# Patient Record
Sex: MALE | Race: WHITE | Employment: OTHER | URBAN - NONMETROPOLITAN AREA
[De-identification: names, ages, dates, MRNs, and addresses within clinical notes are randomized per-mention and may not be internally consistent; named-entity substitution may affect disease eponyms.]

---

## 2023-07-18 ENCOUNTER — HOSPITAL ENCOUNTER (INPATIENT)
Age: 83
LOS: 7 days | Discharge: HOME | DRG: 246 | End: 2023-07-25
Attending: INTERNAL MEDICINE | Admitting: INTERNAL MEDICINE
Payer: MEDICARE

## 2023-07-18 DIAGNOSIS — I50.23 ACUTE ON CHRONIC SYSTOLIC CHF (CONGESTIVE HEART FAILURE), NYHA CLASS 4 (HCC): Primary | ICD-10-CM

## 2023-07-18 PROBLEM — I21.4 NSTEMI (NON-ST ELEVATED MYOCARDIAL INFARCTION) (HCC): Status: ACTIVE | Noted: 2023-07-18

## 2023-07-18 PROBLEM — I50.21 ACUTE HFREF (HEART FAILURE WITH REDUCED EJECTION FRACTION) (HCC): Status: ACTIVE | Noted: 2023-07-18

## 2023-07-18 PROBLEM — I48.91 ATRIAL FIBRILLATION (HCC): Status: ACTIVE | Noted: 2023-07-18

## 2023-07-18 PROBLEM — I35.0 NONRHEUMATIC AORTIC VALVE STENOSIS: Status: ACTIVE | Noted: 2023-07-18

## 2023-07-18 LAB
APTT PPP: 48.5 SECONDS (ref 22–38)
EKG Q-T INTERVAL: 372 MS
EKG QRS DURATION: 134 MS
EKG QTC CALCULATION (BAZETT): 494 MS
EKG R AXIS: 28 DEGREES
EKG T AXIS: -127 DEGREES
EKG VENTRICULAR RATE: 106 BPM
HEPARIN UNFRACTIONATED: 0.21 U/ML (ref 0.3–0.7)
HEPARIN UNFRACTIONATED: 0.45 U/ML (ref 0.3–0.7)
TROPONIN, HIGH SENSITIVITY: 551 NG/L (ref 0–12)
TSH SERPL DL<=0.005 MIU/L-ACNC: 2.01 UIU/ML (ref 0.4–4.2)

## 2023-07-18 PROCEDURE — 84484 ASSAY OF TROPONIN QUANT: CPT

## 2023-07-18 PROCEDURE — 36415 COLL VENOUS BLD VENIPUNCTURE: CPT

## 2023-07-18 PROCEDURE — 85730 THROMBOPLASTIN TIME PARTIAL: CPT

## 2023-07-18 PROCEDURE — 2580000003 HC RX 258: Performed by: PHYSICIAN ASSISTANT

## 2023-07-18 PROCEDURE — 93005 ELECTROCARDIOGRAM TRACING: CPT | Performed by: PHYSICIAN ASSISTANT

## 2023-07-18 PROCEDURE — 6370000000 HC RX 637 (ALT 250 FOR IP): Performed by: INTERNAL MEDICINE

## 2023-07-18 PROCEDURE — 93010 ELECTROCARDIOGRAM REPORT: CPT | Performed by: INTERNAL MEDICINE

## 2023-07-18 PROCEDURE — 2060000000 HC ICU INTERMEDIATE R&B

## 2023-07-18 PROCEDURE — 2500000003 HC RX 250 WO HCPCS: Performed by: INTERNAL MEDICINE

## 2023-07-18 PROCEDURE — 84443 ASSAY THYROID STIM HORMONE: CPT

## 2023-07-18 PROCEDURE — 6360000002 HC RX W HCPCS: Performed by: PHYSICIAN ASSISTANT

## 2023-07-18 PROCEDURE — 99232 SBSQ HOSP IP/OBS MODERATE 35: CPT | Performed by: PHYSICIAN ASSISTANT

## 2023-07-18 PROCEDURE — 85520 HEPARIN ASSAY: CPT

## 2023-07-18 PROCEDURE — 99223 1ST HOSP IP/OBS HIGH 75: CPT | Performed by: INTERNAL MEDICINE

## 2023-07-18 RX ORDER — ACETAMINOPHEN 650 MG/1
650 SUPPOSITORY RECTAL EVERY 6 HOURS PRN
Status: DISCONTINUED | OUTPATIENT
Start: 2023-07-18 | End: 2023-07-25 | Stop reason: HOSPADM

## 2023-07-18 RX ORDER — FUROSEMIDE 20 MG/1
20 TABLET ORAL DAILY
Status: DISCONTINUED | OUTPATIENT
Start: 2023-07-18 | End: 2023-07-19

## 2023-07-18 RX ORDER — SODIUM CHLORIDE 0.9 % (FLUSH) 0.9 %
5-40 SYRINGE (ML) INJECTION PRN
Status: DISCONTINUED | OUTPATIENT
Start: 2023-07-18 | End: 2023-07-20 | Stop reason: SDUPTHER

## 2023-07-18 RX ORDER — ONDANSETRON 2 MG/ML
4 INJECTION INTRAMUSCULAR; INTRAVENOUS EVERY 6 HOURS PRN
Status: DISCONTINUED | OUTPATIENT
Start: 2023-07-18 | End: 2023-07-25 | Stop reason: HOSPADM

## 2023-07-18 RX ORDER — LOSARTAN POTASSIUM 100 MG/1
100 TABLET ORAL DAILY
COMMUNITY

## 2023-07-18 RX ORDER — ACETAMINOPHEN 325 MG/1
650 TABLET ORAL EVERY 6 HOURS PRN
Status: DISCONTINUED | OUTPATIENT
Start: 2023-07-18 | End: 2023-07-20 | Stop reason: SDUPTHER

## 2023-07-18 RX ORDER — SODIUM CHLORIDE 9 MG/ML
INJECTION, SOLUTION INTRAVENOUS PRN
Status: DISCONTINUED | OUTPATIENT
Start: 2023-07-18 | End: 2023-07-20 | Stop reason: SDUPTHER

## 2023-07-18 RX ORDER — POLYETHYLENE GLYCOL 3350 17 G/17G
17 POWDER, FOR SOLUTION ORAL DAILY PRN
Status: DISCONTINUED | OUTPATIENT
Start: 2023-07-18 | End: 2023-07-25 | Stop reason: HOSPADM

## 2023-07-18 RX ORDER — HEPARIN SODIUM 1000 [USP'U]/ML
2000 INJECTION, SOLUTION INTRAVENOUS; SUBCUTANEOUS PRN
Status: DISCONTINUED | OUTPATIENT
Start: 2023-07-18 | End: 2023-07-24 | Stop reason: SDUPTHER

## 2023-07-18 RX ORDER — ASPIRIN 81 MG/1
81 TABLET ORAL DAILY
Status: DISCONTINUED | OUTPATIENT
Start: 2023-07-18 | End: 2023-07-25 | Stop reason: HOSPADM

## 2023-07-18 RX ORDER — SODIUM CHLORIDE 9 MG/ML
INJECTION, SOLUTION INTRAVENOUS CONTINUOUS
Status: DISCONTINUED | OUTPATIENT
Start: 2023-07-18 | End: 2023-07-19

## 2023-07-18 RX ORDER — HEPARIN SODIUM 1000 [USP'U]/ML
4000 INJECTION, SOLUTION INTRAVENOUS; SUBCUTANEOUS PRN
Status: DISCONTINUED | OUTPATIENT
Start: 2023-07-18 | End: 2023-07-24 | Stop reason: SDUPTHER

## 2023-07-18 RX ORDER — SODIUM CHLORIDE 0.9 % (FLUSH) 0.9 %
5-40 SYRINGE (ML) INJECTION EVERY 12 HOURS SCHEDULED
Status: DISCONTINUED | OUTPATIENT
Start: 2023-07-18 | End: 2023-07-20 | Stop reason: SDUPTHER

## 2023-07-18 RX ORDER — LOSARTAN POTASSIUM 100 MG/1
100 TABLET ORAL DAILY
Status: DISCONTINUED | OUTPATIENT
Start: 2023-07-19 | End: 2023-07-20

## 2023-07-18 RX ORDER — HEPARIN SODIUM 10000 [USP'U]/100ML
5-30 INJECTION, SOLUTION INTRAVENOUS CONTINUOUS
Status: DISCONTINUED | OUTPATIENT
Start: 2023-07-18 | End: 2023-07-24

## 2023-07-18 RX ORDER — METOPROLOL SUCCINATE 25 MG/1
25 TABLET, EXTENDED RELEASE ORAL DAILY
Status: DISCONTINUED | OUTPATIENT
Start: 2023-07-18 | End: 2023-07-25 | Stop reason: HOSPADM

## 2023-07-18 RX ORDER — ATORVASTATIN CALCIUM 40 MG/1
40 TABLET, FILM COATED ORAL NIGHTLY
Status: DISCONTINUED | OUTPATIENT
Start: 2023-07-18 | End: 2023-07-24 | Stop reason: ALTCHOICE

## 2023-07-18 RX ORDER — ONDANSETRON 4 MG/1
4 TABLET, ORALLY DISINTEGRATING ORAL EVERY 8 HOURS PRN
Status: DISCONTINUED | OUTPATIENT
Start: 2023-07-18 | End: 2023-07-25 | Stop reason: HOSPADM

## 2023-07-18 RX ADMIN — HEPARIN SODIUM 2000 UNITS: 1000 INJECTION INTRAVENOUS; SUBCUTANEOUS at 13:21

## 2023-07-18 RX ADMIN — SODIUM CHLORIDE: 9 INJECTION, SOLUTION INTRAVENOUS at 12:37

## 2023-07-18 RX ADMIN — AMIODARONE HYDROCHLORIDE 150 MG: 1.5 INJECTION, SOLUTION INTRAVENOUS at 18:08

## 2023-07-18 RX ADMIN — METOPROLOL SUCCINATE 25 MG: 25 TABLET, EXTENDED RELEASE ORAL at 18:10

## 2023-07-18 RX ADMIN — AMIODARONE HYDROCHLORIDE 1 MG/MIN: 1.8 INJECTION, SOLUTION INTRAVENOUS at 18:24

## 2023-07-18 RX ADMIN — ATORVASTATIN CALCIUM 40 MG: 40 TABLET, FILM COATED ORAL at 19:47

## 2023-07-18 RX ADMIN — FUROSEMIDE 20 MG: 20 TABLET ORAL at 18:10

## 2023-07-18 RX ADMIN — ASPIRIN 81 MG: 81 TABLET, COATED ORAL at 18:10

## 2023-07-18 RX ADMIN — AMIODARONE HYDROCHLORIDE 0.5 MG/MIN: 1.8 INJECTION, SOLUTION INTRAVENOUS at 23:53

## 2023-07-18 RX ADMIN — HEPARIN SODIUM 11.3 UNITS/KG/HR: 10000 INJECTION, SOLUTION INTRAVENOUS at 13:16

## 2023-07-18 ASSESSMENT — ENCOUNTER SYMPTOMS
SORE THROAT: 0
VOMITING: 0
NAUSEA: 0
SHORTNESS OF BREATH: 1

## 2023-07-18 ASSESSMENT — PAIN SCALES - GENERAL: PAINLEVEL_OUTOF10: 0

## 2023-07-18 NOTE — CONSULTS
[facility]  Cardiology Consult      Patient:  Karolyn Escobedo  YOB: 1940    MRN: [id number]   Acct: [de-identified]     Primary Care Physician: No primary care provider on file. REASON FOR CONSULT:   CHF  New CMP  Severe aortic stenosis  NSTEMI     CHIEF COMPLAINT:    Shortness of breath     HISTORY OF PRESENT ILLNESS:    Karolyn Escobedo is a pleasant 81year old male patient with past medical history that includes hypertension. He denies prior known h/o cardiac disease. The patient presented to [address] [place] [address],[address] with 2 weeks history of worsening dyspnea on exertion, fatigue and shortness of breath. Patient denies chest pain or syncope. Has intermittent dizziness and leg swelling. Work up at [address] [place] [address] revealed elevated Troponin concerning for NSTEMI. Patient was also diagnosed with atrial fibrillation (new per patient). Echocardiogram revealed an EF of 25%, severe global hypokinesis, LVH, Mild ODALYS, Moderate mitral regurgitation, RVSP 29 mmHg, severe aortic stenosis with NATALY 0.9 cm2, PG 35 mmHg, MG 19 mmHg (per report, images are not available for review). He was transferred to [facility] for further management. At this time, patient denies chest pain. He feels better. EKG at [facility] revealed atrial fibrillation, , PVCs, septal infarct, IVCD. HS Troponin 551. All labs, EKG's, diagnostic testing and images as well as cardiac cath, stress testing   were reviewed during this encounter    CARDIAC TESTING    Ekg:   Atrial fibrillation with rapid ventricular response with premature ventricular or aberrantly conducted complexes  Intraventricular conduction delay  Cannot rule out Septal infarct , age undetermined  Abnormal ECG  No previous ECGs available    Past Medical History:    No past medical history on file. Past Surgical History:    No past surgical history on file.     Medications Prior to Admission:    Medications Prior to Admission: losartan (COZAAR) 100 MG tablet, Take 1

## 2023-07-19 LAB
ANION GAP SERPL CALC-SCNC: 15 MEQ/L (ref 8–16)
BASOPHILS ABSOLUTE: 0 THOU/MM3 (ref 0–0.1)
BASOPHILS NFR BLD AUTO: 0.6 %
BUN SERPL-MCNC: 31 MG/DL (ref 7–22)
CALCIUM SERPL-MCNC: 8.7 MG/DL (ref 8.5–10.5)
CHLORIDE SERPL-SCNC: 105 MEQ/L (ref 98–111)
CO2 SERPL-SCNC: 19 MEQ/L (ref 23–33)
CREAT SERPL-MCNC: 1.3 MG/DL (ref 0.4–1.2)
DEPRECATED RDW RBC AUTO: 45.5 FL (ref 35–45)
EKG ATRIAL RATE: 64 BPM
EKG P AXIS: 68 DEGREES
EKG P-R INTERVAL: 244 MS
EKG Q-T INTERVAL: 482 MS
EKG QRS DURATION: 138 MS
EKG QTC CALCULATION (BAZETT): 497 MS
EKG R AXIS: 51 DEGREES
EKG T AXIS: -92 DEGREES
EKG VENTRICULAR RATE: 64 BPM
EOSINOPHIL NFR BLD AUTO: 0.4 %
EOSINOPHILS ABSOLUTE: 0 THOU/MM3 (ref 0–0.4)
ERYTHROCYTE [DISTWIDTH] IN BLOOD BY AUTOMATED COUNT: 13.9 % (ref 11.5–14.5)
GFR SERPL CREATININE-BSD FRML MDRD: 55 ML/MIN/1.73M2
GLUCOSE SERPL-MCNC: 142 MG/DL (ref 70–108)
HCT VFR BLD AUTO: 39.8 % (ref 42–52)
HEPARIN UNFRACTIONATED: 0.4 U/ML (ref 0.3–0.7)
HEPARIN UNFRACTIONATED: 0.47 U/ML (ref 0.3–0.7)
HGB BLD-MCNC: 12.5 GM/DL (ref 14–18)
IMM GRANULOCYTES # BLD AUTO: 0.03 THOU/MM3 (ref 0–0.07)
IMM GRANULOCYTES NFR BLD AUTO: 0.4 %
LYMPHOCYTES ABSOLUTE: 1 THOU/MM3 (ref 1–4.8)
LYMPHOCYTES NFR BLD AUTO: 11.8 %
MCH RBC QN AUTO: 28.5 PG (ref 26–33)
MCHC RBC AUTO-ENTMCNC: 31.4 GM/DL (ref 32.2–35.5)
MCV RBC AUTO: 90.9 FL (ref 80–94)
MONOCYTES ABSOLUTE: 0.6 THOU/MM3 (ref 0.4–1.3)
MONOCYTES NFR BLD AUTO: 7.8 %
NEUTROPHILS NFR BLD AUTO: 79 %
NRBC BLD AUTO-RTO: 0 /100 WBC
PLATELET # BLD AUTO: 261 THOU/MM3 (ref 130–400)
PMV BLD AUTO: 10 FL (ref 9.4–12.4)
POTASSIUM SERPL-SCNC: 4.7 MEQ/L (ref 3.5–5.2)
RBC # BLD AUTO: 4.38 MILL/MM3 (ref 4.7–6.1)
SEGMENTED NEUTROPHILS ABSOLUTE COUNT: 6.4 THOU/MM3 (ref 1.8–7.7)
SODIUM SERPL-SCNC: 139 MEQ/L (ref 135–145)
WBC # BLD AUTO: 8.1 THOU/MM3 (ref 4.8–10.8)

## 2023-07-19 PROCEDURE — 6370000000 HC RX 637 (ALT 250 FOR IP): Performed by: PHYSICIAN ASSISTANT

## 2023-07-19 PROCEDURE — 99232 SBSQ HOSP IP/OBS MODERATE 35: CPT | Performed by: NURSE PRACTITIONER

## 2023-07-19 PROCEDURE — 93010 ELECTROCARDIOGRAM REPORT: CPT | Performed by: INTERNAL MEDICINE

## 2023-07-19 PROCEDURE — 99232 SBSQ HOSP IP/OBS MODERATE 35: CPT | Performed by: PHYSICIAN ASSISTANT

## 2023-07-19 PROCEDURE — 6370000000 HC RX 637 (ALT 250 FOR IP): Performed by: INTERNAL MEDICINE

## 2023-07-19 PROCEDURE — 93005 ELECTROCARDIOGRAM TRACING: CPT | Performed by: PHYSICIAN ASSISTANT

## 2023-07-19 PROCEDURE — 6360000002 HC RX W HCPCS: Performed by: PHYSICIAN ASSISTANT

## 2023-07-19 PROCEDURE — 6370000000 HC RX 637 (ALT 250 FOR IP): Performed by: NURSE PRACTITIONER

## 2023-07-19 PROCEDURE — 85025 COMPLETE CBC W/AUTO DIFF WBC: CPT

## 2023-07-19 PROCEDURE — 2060000000 HC ICU INTERMEDIATE R&B

## 2023-07-19 PROCEDURE — 36415 COLL VENOUS BLD VENIPUNCTURE: CPT

## 2023-07-19 PROCEDURE — 80048 BASIC METABOLIC PNL TOTAL CA: CPT

## 2023-07-19 PROCEDURE — 2500000003 HC RX 250 WO HCPCS: Performed by: INTERNAL MEDICINE

## 2023-07-19 PROCEDURE — 85520 HEPARIN ASSAY: CPT

## 2023-07-19 RX ORDER — SODIUM CHLORIDE 0.9 % (FLUSH) 0.9 %
5-40 SYRINGE (ML) INJECTION EVERY 12 HOURS SCHEDULED
Status: DISCONTINUED | OUTPATIENT
Start: 2023-07-19 | End: 2023-07-20 | Stop reason: SDUPTHER

## 2023-07-19 RX ORDER — FUROSEMIDE 20 MG/1
20 TABLET ORAL ONCE
Status: COMPLETED | OUTPATIENT
Start: 2023-07-19 | End: 2023-07-19

## 2023-07-19 RX ORDER — SODIUM CHLORIDE 9 MG/ML
INJECTION, SOLUTION INTRAVENOUS PRN
Status: DISCONTINUED | OUTPATIENT
Start: 2023-07-19 | End: 2023-07-20 | Stop reason: SDUPTHER

## 2023-07-19 RX ORDER — FUROSEMIDE 40 MG/1
40 TABLET ORAL DAILY
Status: DISCONTINUED | OUTPATIENT
Start: 2023-07-20 | End: 2023-07-20

## 2023-07-19 RX ORDER — SODIUM CHLORIDE 0.9 % (FLUSH) 0.9 %
5-40 SYRINGE (ML) INJECTION PRN
Status: DISCONTINUED | OUTPATIENT
Start: 2023-07-19 | End: 2023-07-20 | Stop reason: SDUPTHER

## 2023-07-19 RX ADMIN — AMIODARONE HYDROCHLORIDE 0.5 MG/MIN: 1.8 INJECTION, SOLUTION INTRAVENOUS at 23:56

## 2023-07-19 RX ADMIN — FUROSEMIDE 20 MG: 20 TABLET ORAL at 07:54

## 2023-07-19 RX ADMIN — LOSARTAN POTASSIUM 100 MG: 100 TABLET, FILM COATED ORAL at 07:54

## 2023-07-19 RX ADMIN — HEPARIN SODIUM 13.3 UNITS/KG/HR: 10000 INJECTION, SOLUTION INTRAVENOUS at 12:44

## 2023-07-19 RX ADMIN — ASPIRIN 81 MG: 81 TABLET, COATED ORAL at 07:54

## 2023-07-19 RX ADMIN — METOPROLOL SUCCINATE 25 MG: 25 TABLET, EXTENDED RELEASE ORAL at 07:54

## 2023-07-19 RX ADMIN — ATORVASTATIN CALCIUM 40 MG: 40 TABLET, FILM COATED ORAL at 19:57

## 2023-07-19 RX ADMIN — FUROSEMIDE 20 MG: 20 TABLET ORAL at 17:12

## 2023-07-19 RX ADMIN — AMIODARONE HYDROCHLORIDE 0.5 MG/MIN: 1.8 INJECTION, SOLUTION INTRAVENOUS at 12:16

## 2023-07-19 ASSESSMENT — PAIN SCALES - GENERAL
PAINLEVEL_OUTOF10: 0

## 2023-07-19 NOTE — CARE COORDINATION
Case Management Assessment  Initial Evaluation    Date/Time of Evaluation: 7/19/2023 11:37 AM  Assessment Completed by: Mona Chance RN    If patient is discharged prior to next notation, then this note serves as note for discharge by case management. Patient Name: Alondra Peguero                   YOB: 1940  Diagnosis: Atrial fibrillation [facility]) [I48.91]                   Date / Time: 7/18/2023 11:27 AM  Location: [address] [place] [address]     Patient Admission Status: Inpatient   Readmission Risk Low 0-14, Mod 15-19), High > 20: Readmission Risk Score: 8.3    Current PCP: Charis Tapia MD  PCP verified by CM? Yes    Chart Reviewed: Yes      History Provided by: Patient  Patient Orientation: Alert and Oriented    Patient Cognition: Alert    Hospitalization in the last 30 days (Readmission):  No    If yes, Readmission Assessment in CM Navigator will be completed. Advance Directives:      Code Status: Full Code   Patient's Primary Decision Maker is: Legal Next of Kin      Discharge Planning:    Patient lives with: Spouse/Significant Other Type of Home: House  Primary Care Giver: Self  Patient Support Systems include: Spouse/Significant Other, Children   Current Financial resources: Medicare  Current community resources: None  Current services prior to admission: None            Current DME:              Type of Home Care services:  None    ADLS  Prior functional level: Independent in ADLs/IADLs  Current functional level: Independent in ADLs/IADLs    Family can provide assistance at DC: Yes  Would you like Case Management to discuss the discharge plan with any other family members/significant others, and if so, who?  No  Plans to Return to Present Housing: Yes  Other Identified Issues/Barriers to RETURNING to current housing: medical complications  Potential Assistance needed at discharge: N/A            Potential DME:    Patient expects to discharge to: [address] [place] [address] for transportation at discharge:

## 2023-07-20 ENCOUNTER — APPOINTMENT (OUTPATIENT)
Dept: CARDIAC CATH/INVASIVE PROCEDURES | Age: 83
DRG: 246 | End: 2023-07-20
Attending: INTERNAL MEDICINE
Payer: MEDICARE

## 2023-07-20 LAB
BDY SITE: ABNORMAL
BDY SITE: ABNORMAL
COLLECTED BY:: ABNORMAL
COLLECTED BY:: ABNORMAL
DEPRECATED RDW RBC AUTO: 46.7 FL (ref 35–45)
ERYTHROCYTE [DISTWIDTH] IN BLOOD BY AUTOMATED COUNT: 14 % (ref 11.5–14.5)
HCT VFR BLD AUTO: 39.6 % (ref 42–52)
HEPARIN UNFRACTIONATED: 0.42 U/ML (ref 0.3–0.7)
HGB BLD-MCNC: 12.5 GM/DL (ref 14–18)
LV EF: 33 %
LVEF MODALITY: NORMAL
MCH RBC QN AUTO: 29.1 PG (ref 26–33)
MCHC RBC AUTO-ENTMCNC: 31.6 GM/DL (ref 32.2–35.5)
MCV RBC AUTO: 92.1 FL (ref 80–94)
PLATELET # BLD AUTO: 214 THOU/MM3 (ref 130–400)
PMV BLD AUTO: 10.1 FL (ref 9.4–12.4)
RBC # BLD AUTO: 4.3 MILL/MM3 (ref 4.7–6.1)
SAO2 % BLD: 65 % (ref 94–97)
SAO2 % BLD: 98 % (ref 94–97)
WBC # BLD AUTO: 9 THOU/MM3 (ref 4.8–10.8)

## 2023-07-20 PROCEDURE — B24BZZ4 ULTRASONOGRAPHY OF HEART WITH AORTA, TRANSESOPHAGEAL: ICD-10-PCS | Performed by: INTERNAL MEDICINE

## 2023-07-20 PROCEDURE — C1887 CATHETER, GUIDING: HCPCS

## 2023-07-20 PROCEDURE — 93320 DOPPLER ECHO COMPLETE: CPT

## 2023-07-20 PROCEDURE — 2500000003 HC RX 250 WO HCPCS

## 2023-07-20 PROCEDURE — 6360000002 HC RX W HCPCS: Performed by: PHYSICIAN ASSISTANT

## 2023-07-20 PROCEDURE — 2580000003 HC RX 258: Performed by: INTERNAL MEDICINE

## 2023-07-20 PROCEDURE — 2700000000 HC OXYGEN THERAPY PER DAY

## 2023-07-20 PROCEDURE — 85027 COMPLETE CBC AUTOMATED: CPT

## 2023-07-20 PROCEDURE — 93312 ECHO TRANSESOPHAGEAL: CPT

## 2023-07-20 PROCEDURE — B2151ZZ FLUOROSCOPY OF LEFT HEART USING LOW OSMOLAR CONTRAST: ICD-10-PCS | Performed by: INTERNAL MEDICINE

## 2023-07-20 PROCEDURE — 6370000000 HC RX 637 (ALT 250 FOR IP): Performed by: PHYSICIAN ASSISTANT

## 2023-07-20 PROCEDURE — 85520 HEPARIN ASSAY: CPT

## 2023-07-20 PROCEDURE — 82810 BLOOD GASES O2 SAT ONLY: CPT

## 2023-07-20 PROCEDURE — 6370000000 HC RX 637 (ALT 250 FOR IP): Performed by: INTERNAL MEDICINE

## 2023-07-20 PROCEDURE — 2060000000 HC ICU INTERMEDIATE R&B

## 2023-07-20 PROCEDURE — 6360000002 HC RX W HCPCS

## 2023-07-20 PROCEDURE — 2500000003 HC RX 250 WO HCPCS: Performed by: INTERNAL MEDICINE

## 2023-07-20 PROCEDURE — 36415 COLL VENOUS BLD VENIPUNCTURE: CPT

## 2023-07-20 PROCEDURE — C1769 GUIDE WIRE: HCPCS

## 2023-07-20 PROCEDURE — 93325 DOPPLER ECHO COLOR FLOW MAPG: CPT

## 2023-07-20 PROCEDURE — B2111ZZ FLUOROSCOPY OF MULTIPLE CORONARY ARTERIES USING LOW OSMOLAR CONTRAST: ICD-10-PCS | Performed by: INTERNAL MEDICINE

## 2023-07-20 PROCEDURE — 93456 R HRT CORONARY ARTERY ANGIO: CPT

## 2023-07-20 PROCEDURE — 99232 SBSQ HOSP IP/OBS MODERATE 35: CPT | Performed by: PHYSICIAN ASSISTANT

## 2023-07-20 PROCEDURE — 6360000004 HC RX CONTRAST MEDICATION: Performed by: INTERNAL MEDICINE

## 2023-07-20 PROCEDURE — C1894 INTRO/SHEATH, NON-LASER: HCPCS

## 2023-07-20 PROCEDURE — 93456 R HRT CORONARY ARTERY ANGIO: CPT | Performed by: INTERNAL MEDICINE

## 2023-07-20 PROCEDURE — 6370000000 HC RX 637 (ALT 250 FOR IP)

## 2023-07-20 PROCEDURE — 4A023N6 MEASUREMENT OF CARDIAC SAMPLING AND PRESSURE, RIGHT HEART, PERCUTANEOUS APPROACH: ICD-10-PCS | Performed by: INTERNAL MEDICINE

## 2023-07-20 RX ORDER — SODIUM CHLORIDE 0.9 % (FLUSH) 0.9 %
5-40 SYRINGE (ML) INJECTION PRN
Status: DISCONTINUED | OUTPATIENT
Start: 2023-07-20 | End: 2023-07-25 | Stop reason: HOSPADM

## 2023-07-20 RX ORDER — SODIUM CHLORIDE 9 MG/ML
INJECTION, SOLUTION INTRAVENOUS CONTINUOUS
Status: DISCONTINUED | OUTPATIENT
Start: 2023-07-20 | End: 2023-07-21

## 2023-07-20 RX ORDER — ACETAMINOPHEN 325 MG/1
650 TABLET ORAL EVERY 4 HOURS PRN
Status: DISCONTINUED | OUTPATIENT
Start: 2023-07-20 | End: 2023-07-24 | Stop reason: SDUPTHER

## 2023-07-20 RX ORDER — ATROPINE SULFATE 0.4 MG/ML
0.5 INJECTION, SOLUTION INTRAVENOUS
Status: DISPENSED | OUTPATIENT
Start: 2023-07-20 | End: 2023-07-21

## 2023-07-20 RX ORDER — SODIUM CHLORIDE 0.9 % (FLUSH) 0.9 %
5-40 SYRINGE (ML) INJECTION EVERY 12 HOURS SCHEDULED
Status: DISCONTINUED | OUTPATIENT
Start: 2023-07-20 | End: 2023-07-25 | Stop reason: HOSPADM

## 2023-07-20 RX ORDER — BUMETANIDE 0.25 MG/ML
1 INJECTION INTRAMUSCULAR; INTRAVENOUS DAILY
Status: DISCONTINUED | OUTPATIENT
Start: 2023-07-21 | End: 2023-07-25 | Stop reason: HOSPADM

## 2023-07-20 RX ORDER — SODIUM CHLORIDE 9 MG/ML
INJECTION, SOLUTION INTRAVENOUS PRN
Status: DISCONTINUED | OUTPATIENT
Start: 2023-07-20 | End: 2023-07-24 | Stop reason: SDUPTHER

## 2023-07-20 RX ADMIN — ATORVASTATIN CALCIUM 40 MG: 40 TABLET, FILM COATED ORAL at 21:00

## 2023-07-20 RX ADMIN — AMIODARONE HYDROCHLORIDE 0.5 MG/MIN: 1.8 INJECTION, SOLUTION INTRAVENOUS at 12:26

## 2023-07-20 RX ADMIN — ASPIRIN 81 MG: 81 TABLET, COATED ORAL at 09:20

## 2023-07-20 RX ADMIN — EMPAGLIFLOZIN 10 MG: 10 TABLET, FILM COATED ORAL at 21:00

## 2023-07-20 RX ADMIN — HEPARIN SODIUM 13.3 UNITS/KG/HR: 10000 INJECTION, SOLUTION INTRAVENOUS at 13:23

## 2023-07-20 RX ADMIN — LOSARTAN POTASSIUM 100 MG: 100 TABLET, FILM COATED ORAL at 09:20

## 2023-07-20 RX ADMIN — IOPAMIDOL 60 ML: 755 INJECTION, SOLUTION INTRAVENOUS at 19:18

## 2023-07-20 RX ADMIN — METOPROLOL SUCCINATE 25 MG: 25 TABLET, EXTENDED RELEASE ORAL at 09:20

## 2023-07-20 RX ADMIN — SODIUM CHLORIDE, PRESERVATIVE FREE 10 ML: 5 INJECTION INTRAVENOUS at 21:00

## 2023-07-20 RX ADMIN — SODIUM CHLORIDE: 9 INJECTION, SOLUTION INTRAVENOUS at 21:00

## 2023-07-20 ASSESSMENT — PAIN SCALES - GENERAL
PAINLEVEL_OUTOF10: 0

## 2023-07-20 NOTE — BRIEF OP NOTE
[facility] Silve[facility]  Sedation/Analgesia Post Sedation Record    Pt Name: Felipe Swift  Account number: [de-identified]  MRN: [id number]  YOB: 1940  Procedure Performed By: MD MD Julio Paz [place]  Primary Care Physician: Claudia Degroot MD  Date: 7/20/2023    POST-PROCEDURE    Physicians/Assistants: MD MD Julio Paz, MANUEL    Procedure Performed:Cath      Sedation/Anesthesia: Versed/ Fentanyl and 2% xylocaine local anesthesia. Estimated Blood Loss: < 50 ml. Specimens Removed: None         Disposition of Specimen: N/A        Complications: No Immediate Complications.        Post-procedure Diagnosis/Findings:       MV CAD and severe AS  PCI/TAVR vs CTS - will need to discuss with family and patient before proceeding further regarding next best options           MD MD Julio Paz [place]  Electronically signed 7/20/2023 at 7:07 PM  Interventional Cardiology

## 2023-07-21 PROBLEM — I50.20 HEART FAILURE WITH REDUCED EJECTION FRACTION (HCC): Status: ACTIVE | Noted: 2023-07-21

## 2023-07-21 LAB
ANION GAP SERPL CALC-SCNC: 12 MEQ/L (ref 8–16)
BUN SERPL-MCNC: 32 MG/DL (ref 7–22)
CALCIUM SERPL-MCNC: 8.4 MG/DL (ref 8.5–10.5)
CHLORIDE SERPL-SCNC: 107 MEQ/L (ref 98–111)
CO2 SERPL-SCNC: 20 MEQ/L (ref 23–33)
CREAT SERPL-MCNC: 1.6 MG/DL (ref 0.4–1.2)
GFR SERPL CREATININE-BSD FRML MDRD: 43 ML/MIN/1.73M2
GLUCOSE SERPL-MCNC: 124 MG/DL (ref 70–108)
HEPARIN UNFRACTIONATED: 0.23 U/ML (ref 0.3–0.7)
HEPARIN UNFRACTIONATED: 0.6 U/ML (ref 0.3–0.7)
HEPARIN UNFRACTIONATED: 0.62 U/ML (ref 0.3–0.7)
POTASSIUM SERPL-SCNC: 4.3 MEQ/L (ref 3.5–5.2)
SODIUM SERPL-SCNC: 139 MEQ/L (ref 135–145)

## 2023-07-21 PROCEDURE — 85520 HEPARIN ASSAY: CPT

## 2023-07-21 PROCEDURE — 99232 SBSQ HOSP IP/OBS MODERATE 35: CPT | Performed by: PHYSICIAN ASSISTANT

## 2023-07-21 PROCEDURE — 6360000002 HC RX W HCPCS: Performed by: PHYSICIAN ASSISTANT

## 2023-07-21 PROCEDURE — 2500000003 HC RX 250 WO HCPCS: Performed by: INTERNAL MEDICINE

## 2023-07-21 PROCEDURE — 99223 1ST HOSP IP/OBS HIGH 75: CPT | Performed by: INTERNAL MEDICINE

## 2023-07-21 PROCEDURE — 99223 1ST HOSP IP/OBS HIGH 75: CPT | Performed by: THORACIC SURGERY (CARDIOTHORACIC VASCULAR SURGERY)

## 2023-07-21 PROCEDURE — 80048 BASIC METABOLIC PNL TOTAL CA: CPT

## 2023-07-21 PROCEDURE — 6370000000 HC RX 637 (ALT 250 FOR IP): Performed by: INTERNAL MEDICINE

## 2023-07-21 PROCEDURE — 36415 COLL VENOUS BLD VENIPUNCTURE: CPT

## 2023-07-21 PROCEDURE — 2060000000 HC ICU INTERMEDIATE R&B

## 2023-07-21 PROCEDURE — 6370000000 HC RX 637 (ALT 250 FOR IP): Performed by: NURSE PRACTITIONER

## 2023-07-21 RX ORDER — AMIODARONE HYDROCHLORIDE 200 MG/1
200 TABLET ORAL DAILY
Status: DISCONTINUED | OUTPATIENT
Start: 2023-07-27 | End: 2023-07-25 | Stop reason: HOSPADM

## 2023-07-21 RX ORDER — AMIODARONE HYDROCHLORIDE 200 MG/1
200 TABLET ORAL 2 TIMES DAILY
Status: DISCONTINUED | OUTPATIENT
Start: 2023-07-21 | End: 2023-07-25 | Stop reason: HOSPADM

## 2023-07-21 RX ADMIN — EMPAGLIFLOZIN 10 MG: 10 TABLET, FILM COATED ORAL at 10:10

## 2023-07-21 RX ADMIN — AMIODARONE HYDROCHLORIDE 0.5 MG/MIN: 1.8 INJECTION, SOLUTION INTRAVENOUS at 03:05

## 2023-07-21 RX ADMIN — AMIODARONE HYDROCHLORIDE 200 MG: 200 TABLET ORAL at 21:06

## 2023-07-21 RX ADMIN — ATORVASTATIN CALCIUM 40 MG: 40 TABLET, FILM COATED ORAL at 21:07

## 2023-07-21 RX ADMIN — ASPIRIN 81 MG: 81 TABLET, COATED ORAL at 09:50

## 2023-07-21 RX ADMIN — HEPARIN SODIUM 15.3 UNITS/KG/HR: 10000 INJECTION, SOLUTION INTRAVENOUS at 21:14

## 2023-07-21 RX ADMIN — HEPARIN SODIUM 2000 UNITS: 1000 INJECTION INTRAVENOUS; SUBCUTANEOUS at 05:43

## 2023-07-21 RX ADMIN — AMIODARONE HYDROCHLORIDE 0.5 MG/MIN: 1.8 INJECTION, SOLUTION INTRAVENOUS at 15:43

## 2023-07-21 RX ADMIN — METOPROLOL SUCCINATE 25 MG: 25 TABLET, EXTENDED RELEASE ORAL at 17:24

## 2023-07-21 RX ADMIN — BUMETANIDE 1 MG: 0.25 INJECTION INTRAMUSCULAR; INTRAVENOUS at 09:50

## 2023-07-21 ASSESSMENT — ENCOUNTER SYMPTOMS
EYE DISCHARGE: 0
ABDOMINAL PAIN: 0
SHORTNESS OF BREATH: 1
COLOR CHANGE: 0

## 2023-07-21 ASSESSMENT — PAIN SCALES - GENERAL
PAINLEVEL_OUTOF10: 0

## 2023-07-21 NOTE — CARE COORDINATION
7/21/23, 2:03 PM EDT    DISCHARGE ON GOING EVALUATION    [address] [facility] day: 3  Location: -12/012-A Reason for admit: Atrial fibrillation [facility]) [I48.91]   Procedure:   7/20 Cardiac cath   7/20 DON pending EF 30-35%  Barriers to Discharge: CVS consult, Dietician consult, creatinine 1.6, IV fluids, Amiodarone drip, Asa, Lipitor, IV Bumex, Jardiance, Heparin drip, Toprol XL. PCP: Jonn Scott MD  Readmission Risk Score: 11.9%  Patient Goals/Plan/Treatment Preferences: Plan is to return home with spouse. Denies needs. Will follow. 7/21/23, 2:25 PM EDT    Patient goals/plan/ treatment preferences discussed by  and . Patient goals/plan/ treatment preferences reviewed with patient/ family. Patient/ family verbalize understanding of discharge plan and are in agreement with goal/plan/treatment preferences. Understanding was demonstrated using the teach back method. AVS provided by RN at time of discharge, which includes all necessary medical information pertaining to the patients current course of illness, treatment, post-discharge goals of care, and treatment preferences. Services At/After Discharge: None  Potential weekend discharge. Denies needs/services.         IMM Letter  IMM Letter given to Patient/Family/Significant other/Guardian/POA/by[de-identified] ALL  IMM Letter date given[de-identified] 07/21/23  IMM Letter time given[de-identified] 5435

## 2023-07-21 NOTE — CONSULTS
Cardiothoracic Surgery History & Physical       Patient:  Korey Martinez  YOB: 1940    MRN: [id number]     Acct: [de-identified]    PCP: Kaden Seth MD    Date of Admission: 7/18/2023    Chief Complaint:  Dyspnea, fatigue    History Of Present Illness:  81 y.o. male, no previous history of CV disease, presents with chronic progressive fatigue and dyspnea on minimal exertion, acutely exacerbated by new onset of atrial fibrillation. Ruled in for NSTEMI. Patient denies chest pain. Admission echocardiography shows LVEF 25%, dilated cardiomyopathy, severe AS/moderate AI, and moderate MR. Patient denies orthopnea or presyncope. Subsequent LHC shows RCA and Cx , high-grade LAD lesion. Past Medical History:      Hypertension    Past Surgical History:      No past surgical history on file. Medications Prior to Admission:      Prior to Admission medications    Medication Sig Start Date End Date Taking? Authorizing Provider   losartan (COZAAR) 100 MG tablet Take 1 tablet by mouth daily   Yes Historical Provider, MD       Allergies:  Patient has no known allergies. Social History:      TOBACCO:   reports that he has never smoked. He has never used smokeless tobacco.  ETOH:   reports no history of alcohol use. Social History     Substance and Sexual Activity   Drug Use Never         Family History:      No family history on file. REVIEW OFSYSTEMS:      Review of Systems   Constitutional:  Positive for activity change and fatigue. HENT:  Negative for congestion. Eyes:  Negative for discharge. Respiratory:  Positive for shortness of breath. Cardiovascular:  Positive for palpitations. Negative for chest pain and leg swelling. Gastrointestinal:  Negative for abdominal pain. Endocrine: Negative for cold intolerance. Genitourinary:  Negative for difficulty urinating. Skin:  Negative for color change. Allergic/Immunologic: Negative for environmental allergies.    Neurological:

## 2023-07-21 NOTE — PROCEDURES
[place] [address]                            CARDIAC CATHETERIZATION    PATIENT NAME: Jomar Metzger                     :        1940  MED REC NO:   [id number]                           ROOM:       0012  ACCOUNT NO:   [de-identified]                           ADMIT DATE: 2023  PROVIDER:     Brian Yip MD    DATE OF PROCEDURE:  2023    CARDIAC CATHETERIZATION    INDICATION:  NYHA class IV CHF, severe symptomatic aortic stenosis, low  ejection fraction/cardiomyopathy. DESCRIPTION OF PROCEDURE:  After informed consent was obtained from the  patient, he was brought to the cardiac catheterization laboratory and  prepped in sterile fashion. Right radial artery and right brachial vein  were chosen as primary point of access. After infiltration of the right  brachial region with 2% lidocaine using micropuncture and modified  Seldinger technique under fluoroscopic guidance, and ultrasound  guidance, I was able to insert a 5-Cayman Islander sheath into the right brachial  vein. After infiltration of the right wrist with 2% lidocaine using  micropuncture and modified Seldinger technique under fluoroscopic  guidance and ultrasound guidance, I was able to insert a 6-Cay[place] Islander  Slender sheath into right radial artery. Standard  antispasmodic/antithrombotic cocktail was given intraarterially. We  then performed diagnostic right heart catheterization using a 5-Cay[place] Islander  Ahumada balloon-tipped wedge catheter. We did left heart catheterization  using 5-Cay[place] Islander JL3.5 and JR4 catheters. RIGHT HEART CATHETERIZATION:  RA 19, RV 64/20, PA 61/29 with a mean of  40, wedge pressure of 32, cardiac index 2, PA saturation 65%. CORONARY ANGIOGRAM:  LEFT MAIN:  Patent without any significant obstruction. LAD:  Severe multilevel disease with 70% to 80% throughout the entire  proximal and mid section of the LAD.   There is a diagonal branch

## 2023-07-22 ENCOUNTER — APPOINTMENT (OUTPATIENT)
Dept: INTERVENTIONAL RADIOLOGY/VASCULAR | Age: 83
DRG: 246 | End: 2023-07-22
Attending: INTERNAL MEDICINE
Payer: MEDICARE

## 2023-07-22 PROBLEM — I25.5 ISCHEMIC CARDIOMYOPATHY: Status: ACTIVE | Noted: 2023-07-22

## 2023-07-22 PROBLEM — Z01.818 PREOPERATIVE CLEARANCE: Status: ACTIVE | Noted: 2023-07-22

## 2023-07-22 PROBLEM — Z98.890 S/P CARDIAC CATH: Status: ACTIVE | Noted: 2023-07-22

## 2023-07-22 PROBLEM — I25.10 CAD, MULTIPLE VESSEL: Status: ACTIVE | Noted: 2023-07-22

## 2023-07-22 PROBLEM — N18.30 STAGE 3 CHRONIC KIDNEY DISEASE (HCC): Status: ACTIVE | Noted: 2023-07-22

## 2023-07-22 PROBLEM — N19 RENAL FAILURE SYNDROME: Status: ACTIVE | Noted: 2023-07-22

## 2023-07-22 PROBLEM — I50.23 ACUTE ON CHRONIC SYSTOLIC CHF (CONGESTIVE HEART FAILURE), NYHA CLASS 4 (HCC): Status: ACTIVE | Noted: 2023-07-18

## 2023-07-22 LAB
ALBUMIN SERPL BCG-MCNC: 3.2 G/DL (ref 3.5–5.1)
ALP SERPL-CCNC: 94 U/L (ref 38–126)
ALT SERPL W/O P-5'-P-CCNC: 38 U/L (ref 11–66)
ANION GAP SERPL CALC-SCNC: 12 MEQ/L (ref 8–16)
APTT PPP: 142.2 SECONDS (ref 22–38)
APTT PPP: 149.3 SECONDS (ref 22–38)
AST SERPL-CCNC: 19 U/L (ref 5–40)
BILIRUB CONJ SERPL-MCNC: < 0.2 MG/DL (ref 0–0.3)
BILIRUB SERPL-MCNC: 0.6 MG/DL (ref 0.3–1.2)
BUN SERPL-MCNC: 30 MG/DL (ref 7–22)
CALCIUM SERPL-MCNC: 8.6 MG/DL (ref 8.5–10.5)
CHLORIDE SERPL-SCNC: 106 MEQ/L (ref 98–111)
CHOLEST SERPL-MCNC: 128 MG/DL (ref 100–199)
CO2 SERPL-SCNC: 22 MEQ/L (ref 23–33)
CREAT SERPL-MCNC: 1.6 MG/DL (ref 0.4–1.2)
DEPRECATED RDW RBC AUTO: 48.2 FL (ref 35–45)
ERYTHROCYTE [DISTWIDTH] IN BLOOD BY AUTOMATED COUNT: 14.2 % (ref 11.5–14.5)
GFR SERPL CREATININE-BSD FRML MDRD: 43 ML/MIN/1.73M2
GLUCOSE SERPL-MCNC: 109 MG/DL (ref 70–108)
HCT VFR BLD AUTO: 38.1 % (ref 42–52)
HDLC SERPL-MCNC: 40 MG/DL
HEPARIN UNFRACTIONATED: 0.55 U/ML (ref 0.3–0.7)
HEPARIN UNFRACTIONATED: 0.59 U/ML (ref 0.3–0.7)
HEPARIN UNFRACTIONATED: 0.59 U/ML (ref 0.3–0.7)
HEPARIN UNFRACTIONATED: 0.71 U/ML (ref 0.3–0.7)
HGB BLD-MCNC: 12 GM/DL (ref 14–18)
INR PPP: 1.3 (ref 0.85–1.13)
LDLC SERPL CALC-MCNC: 75 MG/DL
MCH RBC QN AUTO: 29.1 PG (ref 26–33)
MCHC RBC AUTO-ENTMCNC: 31.5 GM/DL (ref 32.2–35.5)
MCV RBC AUTO: 92.5 FL (ref 80–94)
PLATELET # BLD AUTO: 198 THOU/MM3 (ref 130–400)
PMV BLD AUTO: 10.5 FL (ref 9.4–12.4)
POTASSIUM SERPL-SCNC: 4.3 MEQ/L (ref 3.5–5.2)
PROT SERPL-MCNC: 5.8 G/DL (ref 6.1–8)
RBC # BLD AUTO: 4.12 MILL/MM3 (ref 4.7–6.1)
SODIUM SERPL-SCNC: 140 MEQ/L (ref 135–145)
TRIGL SERPL-MCNC: 67 MG/DL (ref 0–199)
WBC # BLD AUTO: 9.9 THOU/MM3 (ref 4.8–10.8)

## 2023-07-22 PROCEDURE — 93880 EXTRACRANIAL BILAT STUDY: CPT

## 2023-07-22 PROCEDURE — 85520 HEPARIN ASSAY: CPT

## 2023-07-22 PROCEDURE — 85610 PROTHROMBIN TIME: CPT

## 2023-07-22 PROCEDURE — 94761 N-INVAS EAR/PLS OXIMETRY MLT: CPT

## 2023-07-22 PROCEDURE — 2060000000 HC ICU INTERMEDIATE R&B

## 2023-07-22 PROCEDURE — 6370000000 HC RX 637 (ALT 250 FOR IP): Performed by: NURSE PRACTITIONER

## 2023-07-22 PROCEDURE — 99222 1ST HOSP IP/OBS MODERATE 55: CPT | Performed by: INTERNAL MEDICINE

## 2023-07-22 PROCEDURE — 6370000000 HC RX 637 (ALT 250 FOR IP): Performed by: INTERNAL MEDICINE

## 2023-07-22 PROCEDURE — 36415 COLL VENOUS BLD VENIPUNCTURE: CPT

## 2023-07-22 PROCEDURE — 85027 COMPLETE CBC AUTOMATED: CPT

## 2023-07-22 PROCEDURE — 82248 BILIRUBIN DIRECT: CPT

## 2023-07-22 PROCEDURE — 85730 THROMBOPLASTIN TIME PARTIAL: CPT

## 2023-07-22 PROCEDURE — 99232 SBSQ HOSP IP/OBS MODERATE 35: CPT | Performed by: NURSE PRACTITIONER

## 2023-07-22 PROCEDURE — 80053 COMPREHEN METABOLIC PANEL: CPT

## 2023-07-22 PROCEDURE — 99222 1ST HOSP IP/OBS MODERATE 55: CPT | Performed by: HOSPITALIST

## 2023-07-22 PROCEDURE — 80061 LIPID PANEL: CPT

## 2023-07-22 PROCEDURE — 2580000003 HC RX 258: Performed by: NURSE PRACTITIONER

## 2023-07-22 PROCEDURE — 93005 ELECTROCARDIOGRAM TRACING: CPT | Performed by: NURSE PRACTITIONER

## 2023-07-22 RX ORDER — SODIUM CHLORIDE 0.9 % (FLUSH) 0.9 %
5-40 SYRINGE (ML) INJECTION PRN
Status: DISCONTINUED | OUTPATIENT
Start: 2023-07-22 | End: 2023-07-24 | Stop reason: SDUPTHER

## 2023-07-22 RX ORDER — SODIUM CHLORIDE 9 MG/ML
INJECTION, SOLUTION INTRAVENOUS CONTINUOUS
Status: DISCONTINUED | OUTPATIENT
Start: 2023-07-22 | End: 2023-07-25 | Stop reason: HOSPADM

## 2023-07-22 RX ORDER — NITROGLYCERIN 0.4 MG/1
0.4 TABLET SUBLINGUAL EVERY 5 MIN PRN
Status: DISCONTINUED | OUTPATIENT
Start: 2023-07-22 | End: 2023-07-22

## 2023-07-22 RX ORDER — SODIUM CHLORIDE 9 MG/ML
INJECTION, SOLUTION INTRAVENOUS PRN
Status: DISCONTINUED | OUTPATIENT
Start: 2023-07-22 | End: 2023-07-24 | Stop reason: SDUPTHER

## 2023-07-22 RX ORDER — SODIUM CHLORIDE 0.9 % (FLUSH) 0.9 %
5-40 SYRINGE (ML) INJECTION EVERY 12 HOURS SCHEDULED
Status: DISCONTINUED | OUTPATIENT
Start: 2023-07-22 | End: 2023-07-24 | Stop reason: SDUPTHER

## 2023-07-22 RX ADMIN — AMIODARONE HYDROCHLORIDE 200 MG: 200 TABLET ORAL at 20:00

## 2023-07-22 RX ADMIN — METOPROLOL SUCCINATE 25 MG: 25 TABLET, EXTENDED RELEASE ORAL at 08:03

## 2023-07-22 RX ADMIN — EMPAGLIFLOZIN 10 MG: 10 TABLET, FILM COATED ORAL at 08:03

## 2023-07-22 RX ADMIN — ASPIRIN 81 MG: 81 TABLET, COATED ORAL at 08:03

## 2023-07-22 RX ADMIN — AMIODARONE HYDROCHLORIDE 200 MG: 200 TABLET ORAL at 08:03

## 2023-07-22 RX ADMIN — ATORVASTATIN CALCIUM 40 MG: 40 TABLET, FILM COATED ORAL at 20:00

## 2023-07-22 RX ADMIN — SODIUM CHLORIDE: 9 INJECTION, SOLUTION INTRAVENOUS at 06:01

## 2023-07-22 ASSESSMENT — ENCOUNTER SYMPTOMS: SHORTNESS OF BREATH: 1

## 2023-07-22 ASSESSMENT — PAIN SCALES - GENERAL
PAINLEVEL_OUTOF10: 0

## 2023-07-22 NOTE — CONSULTS
[facility]          [address] [address]        [address]        [facility], [address] [facility],[phone]           Inpatient Initial consult note         7/22/2023 5:45 PM      Patient Name:   Toni Albrecht  Birthdate:    [birth year]  Primary Care Physician:  Williams Valdivia MD   Admit Date:    7/18/2023 11:27 AM    Consultation requested by : Corine Cabrera MD    Reason for Consult : Nephrology following for KELLY/CKD/clearance for cardiac cath. History of presenting illness :Toni Albrecht is a 81 y.o.   male with Past Medical History as stated below presented with a chief complaint of No chief complaint on file. on 7/18/2023 . Patient presented with chief complaints of generalized malaise and has been not feeling well for the last 3 days associated with some shortness of breath denies any chest pain fever chills nausea vomiting patient went to the outside facility where he had an echocardiogram done which showed severe ejection fraction reduction of 25% and severe aortic stenosis subsequently transferred to [facility] for further evaluation and management. He presented with a creatinine of 1.3 he had a cardiac catheterization done on 7/20 which showed multivessel coronary artery disease and he is planned for a repeat cardiac catheterization on Monday. He is also being evaluated for TAVR as an outpatient    Past History:  No past medical history on file. No past surgical history on file.   Social History     Socioeconomic History    Marital status:      Spouse name: Not on file    Number of children: Not on file    Years of education: Not on file    Highest education level: Not on file   Occupational History    Not on file   Tobacco Use    Smoking status: Never    Smokeless tobacco: Never   Substance and Sexual Activity    Alcohol use: Never    Drug use: Never    Sexual activity: Not on file   Other Topics Concern    Not on file   Social History Narrative    Not on

## 2023-07-22 NOTE — CONSULTS
[facility] [address] [place] [address]    Patient's Name/Date of Birth: Selma Spatz / 1940 (59 y.o.)    Date: July 22, 2023     Referring Provider: Luke Phillips MD    CHIEF COMPLAINT: SOB      HPI: This is a pleasant 81 y.o. male hx of HTN who presents for evaluation of sob. 2 weeks of progressive symptoms of WHITE, PND, and LE swelling. He has worse fatigue. He was able to do all of his ADLS previously but not any further. He has not passed out, no palpitations. He is  and has good support system. His TTE showed NATALY 0.9 cm2 with low gradient per report. He was also found to be in Afib RVR. He was diuresed feels better. He then proceed with DON with DSE showing severe LF-LG AS with contractile reserve and moderate MR. Cath completed showing MV CAD with severe LAD stenosis and associated collateralization. He spontaneous converted to NSR. No recurrence. Cr 1.3-1.6. He is making urine. He has  partial dentures. He has no cancer history. He is otherwise functional at home. Echo: No results found for this or any previous visit. All labs, EKG's, diagnostic testing and images as well as cardiac cath, stress testing were reviewed during this encounter    No past medical history on file. No past surgical history on file.   Current Facility-Administered Medications   Medication Dose Route Frequency Provider Last Rate Last Admin    0.9 % sodium chloride infusion   IntraVENous Continuous Edith L Hempfling, APRN - CNP 40 mL/hr at 07/22/23 0629 Rate Verify at 07/22/23 0629    sodium chloride flush 0.9 % injection 5-40 mL  5-40 mL IntraVENous 2 times per day Edith L Hempfling, APRN - CNP        sodium chloride flush 0.9 % injection 5-40 mL  5-40 mL IntraVENous PRN Edith L Hempfling, APRN - CNP        0.9 % sodium chloride infusion   IntraVENous PRN Edith L Hempfling, APRN - CNP        nitroGLYCERIN (NITROSTAT) SL tablet 0.4 mg  0.4 mg SubLINGual Q5 Min PRN Edith L Hempfling,

## 2023-07-22 NOTE — FLOWSHEET NOTE
07/22/23 0432   Treatment Team Notification   Reason for Communication Critical results   Type of Critical Result Laboratory   Critical Lab Information Aptt   Critical Lab Result Type Coagulation   Name of Team Member Notified [facility]   Treatment Team Role Advanced Practice Nurse   Method of Communication Secure Message   Response No new orders   Notification Time 9201     Patient had aptt drawn for preprocedure for his heart cath. Aptt critical reported back as 149.3. [facility] notified. No new orders.

## 2023-07-23 LAB
ALBUMIN SERPL BCG-MCNC: 2.8 G/DL (ref 3.5–5.1)
ALP SERPL-CCNC: 90 U/L (ref 38–126)
ALT SERPL W/O P-5'-P-CCNC: 33 U/L (ref 11–66)
ANION GAP SERPL CALC-SCNC: 12 MEQ/L (ref 8–16)
AST SERPL-CCNC: 16 U/L (ref 5–40)
BILIRUB SERPL-MCNC: 0.8 MG/DL (ref 0.3–1.2)
BUN SERPL-MCNC: 26 MG/DL (ref 7–22)
CALCIUM SERPL-MCNC: 8.2 MG/DL (ref 8.5–10.5)
CHLORIDE SERPL-SCNC: 109 MEQ/L (ref 98–111)
CO2 SERPL-SCNC: 20 MEQ/L (ref 23–33)
CREAT SERPL-MCNC: 1.5 MG/DL (ref 0.4–1.2)
DEPRECATED RDW RBC AUTO: 48.5 FL (ref 35–45)
ERYTHROCYTE [DISTWIDTH] IN BLOOD BY AUTOMATED COUNT: 14.2 % (ref 11.5–14.5)
GFR SERPL CREATININE-BSD FRML MDRD: 46 ML/MIN/1.73M2
GLUCOSE SERPL-MCNC: 112 MG/DL (ref 70–108)
HCT VFR BLD AUTO: 36.9 % (ref 42–52)
HEPARIN UNFRACTIONATED: 0.54 U/ML (ref 0.3–0.7)
HGB BLD-MCNC: 11.2 GM/DL (ref 14–18)
MCH RBC QN AUTO: 28.3 PG (ref 26–33)
MCHC RBC AUTO-ENTMCNC: 30.4 GM/DL (ref 32.2–35.5)
MCV RBC AUTO: 93.2 FL (ref 80–94)
PLATELET # BLD AUTO: 178 THOU/MM3 (ref 130–400)
PMV BLD AUTO: 10.5 FL (ref 9.4–12.4)
POTASSIUM SERPL-SCNC: 4 MEQ/L (ref 3.5–5.2)
PROT SERPL-MCNC: 5.4 G/DL (ref 6.1–8)
RBC # BLD AUTO: 3.96 MILL/MM3 (ref 4.7–6.1)
SODIUM SERPL-SCNC: 141 MEQ/L (ref 135–145)
WBC # BLD AUTO: 7.7 THOU/MM3 (ref 4.8–10.8)

## 2023-07-23 PROCEDURE — 99232 SBSQ HOSP IP/OBS MODERATE 35: CPT | Performed by: INTERNAL MEDICINE

## 2023-07-23 PROCEDURE — 6360000002 HC RX W HCPCS: Performed by: PHYSICIAN ASSISTANT

## 2023-07-23 PROCEDURE — 6370000000 HC RX 637 (ALT 250 FOR IP): Performed by: NURSE PRACTITIONER

## 2023-07-23 PROCEDURE — 80053 COMPREHEN METABOLIC PANEL: CPT

## 2023-07-23 PROCEDURE — 99232 SBSQ HOSP IP/OBS MODERATE 35: CPT | Performed by: PHYSICIAN ASSISTANT

## 2023-07-23 PROCEDURE — 85027 COMPLETE CBC AUTOMATED: CPT

## 2023-07-23 PROCEDURE — 85520 HEPARIN ASSAY: CPT

## 2023-07-23 PROCEDURE — 2580000003 HC RX 258: Performed by: NURSE PRACTITIONER

## 2023-07-23 PROCEDURE — 6370000000 HC RX 637 (ALT 250 FOR IP): Performed by: INTERNAL MEDICINE

## 2023-07-23 PROCEDURE — 99232 SBSQ HOSP IP/OBS MODERATE 35: CPT | Performed by: STUDENT IN AN ORGANIZED HEALTH CARE EDUCATION/TRAINING PROGRAM

## 2023-07-23 PROCEDURE — 2060000000 HC ICU INTERMEDIATE R&B

## 2023-07-23 PROCEDURE — 36415 COLL VENOUS BLD VENIPUNCTURE: CPT

## 2023-07-23 PROCEDURE — 93010 ELECTROCARDIOGRAM REPORT: CPT | Performed by: INTERNAL MEDICINE

## 2023-07-23 RX ADMIN — AMIODARONE HYDROCHLORIDE 200 MG: 200 TABLET ORAL at 19:56

## 2023-07-23 RX ADMIN — METOPROLOL SUCCINATE 25 MG: 25 TABLET, EXTENDED RELEASE ORAL at 08:15

## 2023-07-23 RX ADMIN — ATORVASTATIN CALCIUM 40 MG: 40 TABLET, FILM COATED ORAL at 19:56

## 2023-07-23 RX ADMIN — ASPIRIN 81 MG: 81 TABLET, COATED ORAL at 08:15

## 2023-07-23 RX ADMIN — AMIODARONE HYDROCHLORIDE 200 MG: 200 TABLET ORAL at 08:15

## 2023-07-23 RX ADMIN — HEPARIN SODIUM 14.3 UNITS/KG/HR: 10000 INJECTION, SOLUTION INTRAVENOUS at 15:16

## 2023-07-23 RX ADMIN — SODIUM CHLORIDE: 9 INJECTION, SOLUTION INTRAVENOUS at 02:44

## 2023-07-23 ASSESSMENT — PAIN SCALES - GENERAL
PAINLEVEL_OUTOF10: 0

## 2023-07-24 ENCOUNTER — APPOINTMENT (OUTPATIENT)
Dept: CARDIAC CATH/INVASIVE PROCEDURES | Age: 83
DRG: 246 | End: 2023-07-24
Attending: INTERNAL MEDICINE
Payer: MEDICARE

## 2023-07-24 LAB
ABO: NORMAL
ACTIVATED CLOTTING TIME: 311 SECONDS (ref 1–150)
ANION GAP SERPL CALC-SCNC: 12 MEQ/L (ref 8–16)
ANTIBODY SCREEN: NORMAL
APTT PPP: 111.1 SECONDS (ref 22–38)
APTT PPP: 27.3 SECONDS (ref 22–38)
BUN SERPL-MCNC: 23 MG/DL (ref 7–22)
CALCIUM SERPL-MCNC: 8.3 MG/DL (ref 8.5–10.5)
CHLORIDE SERPL-SCNC: 106 MEQ/L (ref 98–111)
CO2 SERPL-SCNC: 21 MEQ/L (ref 23–33)
CREAT SERPL-MCNC: 1.4 MG/DL (ref 0.4–1.2)
DEPRECATED RDW RBC AUTO: 48.3 FL (ref 35–45)
DEPRECATED RDW RBC AUTO: 48.8 FL (ref 35–45)
EKG ATRIAL RATE: 57 BPM
EKG ATRIAL RATE: 64 BPM
EKG ATRIAL RATE: 67 BPM
EKG P AXIS: 150 DEGREES
EKG P AXIS: 55 DEGREES
EKG P AXIS: 58 DEGREES
EKG P-R INTERVAL: 238 MS
EKG P-R INTERVAL: 240 MS
EKG P-R INTERVAL: 252 MS
EKG Q-T INTERVAL: 462 MS
EKG Q-T INTERVAL: 464 MS
EKG Q-T INTERVAL: 468 MS
EKG QRS DURATION: 140 MS
EKG QRS DURATION: 140 MS
EKG QRS DURATION: 142 MS
EKG QTC CALCULATION (BAZETT): 455 MS
EKG QTC CALCULATION (BAZETT): 478 MS
EKG QTC CALCULATION (BAZETT): 488 MS
EKG R AXIS: 32 DEGREES
EKG R AXIS: 42 DEGREES
EKG R AXIS: 96 DEGREES
EKG T AXIS: -34 DEGREES
EKG T AXIS: -43 DEGREES
EKG T AXIS: -90 DEGREES
EKG VENTRICULAR RATE: 57 BPM
EKG VENTRICULAR RATE: 64 BPM
EKG VENTRICULAR RATE: 67 BPM
ERYTHROCYTE [DISTWIDTH] IN BLOOD BY AUTOMATED COUNT: 14.2 % (ref 11.5–14.5)
ERYTHROCYTE [DISTWIDTH] IN BLOOD BY AUTOMATED COUNT: 14.3 % (ref 11.5–14.5)
GFR SERPL CREATININE-BSD FRML MDRD: 50 ML/MIN/1.73M2
GLUCOSE SERPL-MCNC: 115 MG/DL (ref 70–108)
HCT VFR BLD AUTO: 38.8 % (ref 42–52)
HCT VFR BLD AUTO: 42.6 % (ref 42–52)
HEPARIN UNFRACTIONATED: 0.51 U/ML (ref 0.3–0.7)
HEPARIN UNFRACTIONATED: < 0.04 U/ML (ref 0.3–0.7)
HGB BLD-MCNC: 12 GM/DL (ref 14–18)
HGB BLD-MCNC: 13.3 GM/DL (ref 14–18)
INR PPP: 1.12 (ref 0.85–1.13)
INR PPP: 1.22 (ref 0.85–1.13)
MCH RBC QN AUTO: 28.9 PG (ref 26–33)
MCH RBC QN AUTO: 29 PG (ref 26–33)
MCHC RBC AUTO-ENTMCNC: 30.9 GM/DL (ref 32.2–35.5)
MCHC RBC AUTO-ENTMCNC: 31.2 GM/DL (ref 32.2–35.5)
MCV RBC AUTO: 92.8 FL (ref 80–94)
MCV RBC AUTO: 93.5 FL (ref 80–94)
PLATELET # BLD AUTO: 190 THOU/MM3 (ref 130–400)
PLATELET # BLD AUTO: 196 THOU/MM3 (ref 130–400)
PMV BLD AUTO: 10.9 FL (ref 9.4–12.4)
PMV BLD AUTO: 11.1 FL (ref 9.4–12.4)
POTASSIUM SERPL-SCNC: 4.1 MEQ/L (ref 3.5–5.2)
RBC # BLD AUTO: 4.15 MILL/MM3 (ref 4.7–6.1)
RBC # BLD AUTO: 4.59 MILL/MM3 (ref 4.7–6.1)
RH FACTOR: NORMAL
SODIUM SERPL-SCNC: 139 MEQ/L (ref 135–145)
WBC # BLD AUTO: 6.6 THOU/MM3 (ref 4.8–10.8)
WBC # BLD AUTO: 7.4 THOU/MM3 (ref 4.8–10.8)

## 2023-07-24 PROCEDURE — C1894 INTRO/SHEATH, NON-LASER: HCPCS

## 2023-07-24 PROCEDURE — C1887 CATHETER, GUIDING: HCPCS

## 2023-07-24 PROCEDURE — 99232 SBSQ HOSP IP/OBS MODERATE 35: CPT | Performed by: INTERNAL MEDICINE

## 2023-07-24 PROCEDURE — 80048 BASIC METABOLIC PNL TOTAL CA: CPT

## 2023-07-24 PROCEDURE — 85027 COMPLETE CBC AUTOMATED: CPT

## 2023-07-24 PROCEDURE — 027035Z DILATION OF CORONARY ARTERY, ONE ARTERY WITH TWO DRUG-ELUTING INTRALUMINAL DEVICES, PERCUTANEOUS APPROACH: ICD-10-PCS | Performed by: INTERNAL MEDICINE

## 2023-07-24 PROCEDURE — 6370000000 HC RX 637 (ALT 250 FOR IP)

## 2023-07-24 PROCEDURE — 6370000000 HC RX 637 (ALT 250 FOR IP): Performed by: STUDENT IN AN ORGANIZED HEALTH CARE EDUCATION/TRAINING PROGRAM

## 2023-07-24 PROCEDURE — C1769 GUIDE WIRE: HCPCS

## 2023-07-24 PROCEDURE — 2500000003 HC RX 250 WO HCPCS

## 2023-07-24 PROCEDURE — 86900 BLOOD TYPING SEROLOGIC ABO: CPT

## 2023-07-24 PROCEDURE — 99232 SBSQ HOSP IP/OBS MODERATE 35: CPT | Performed by: PHYSICIAN ASSISTANT

## 2023-07-24 PROCEDURE — 6360000004 HC RX CONTRAST MEDICATION: Performed by: INTERNAL MEDICINE

## 2023-07-24 PROCEDURE — 85610 PROTHROMBIN TIME: CPT

## 2023-07-24 PROCEDURE — 85520 HEPARIN ASSAY: CPT

## 2023-07-24 PROCEDURE — 85730 THROMBOPLASTIN TIME PARTIAL: CPT

## 2023-07-24 PROCEDURE — 2580000003 HC RX 258: Performed by: INTERNAL MEDICINE

## 2023-07-24 PROCEDURE — 6370000000 HC RX 637 (ALT 250 FOR IP): Performed by: INTERNAL MEDICINE

## 2023-07-24 PROCEDURE — 6360000002 HC RX W HCPCS: Performed by: PHYSICIAN ASSISTANT

## 2023-07-24 PROCEDURE — 86901 BLOOD TYPING SEROLOGIC RH(D): CPT

## 2023-07-24 PROCEDURE — C1874 STENT, COATED/COV W/DEL SYS: HCPCS

## 2023-07-24 PROCEDURE — 2580000003 HC RX 258: Performed by: NURSE PRACTITIONER

## 2023-07-24 PROCEDURE — C9600 PERC DRUG-EL COR STENT SING: HCPCS

## 2023-07-24 PROCEDURE — 92928 PRQ TCAT PLMT NTRAC ST 1 LES: CPT | Performed by: INTERNAL MEDICINE

## 2023-07-24 PROCEDURE — 86850 RBC ANTIBODY SCREEN: CPT

## 2023-07-24 PROCEDURE — 2060000000 HC ICU INTERMEDIATE R&B

## 2023-07-24 PROCEDURE — 93005 ELECTROCARDIOGRAM TRACING: CPT | Performed by: STUDENT IN AN ORGANIZED HEALTH CARE EDUCATION/TRAINING PROGRAM

## 2023-07-24 PROCEDURE — 36415 COLL VENOUS BLD VENIPUNCTURE: CPT

## 2023-07-24 PROCEDURE — C1725 CATH, TRANSLUMIN NON-LASER: HCPCS

## 2023-07-24 PROCEDURE — 93005 ELECTROCARDIOGRAM TRACING: CPT | Performed by: INTERNAL MEDICINE

## 2023-07-24 PROCEDURE — 6370000000 HC RX 637 (ALT 250 FOR IP): Performed by: NURSE PRACTITIONER

## 2023-07-24 PROCEDURE — 6360000002 HC RX W HCPCS: Performed by: STUDENT IN AN ORGANIZED HEALTH CARE EDUCATION/TRAINING PROGRAM

## 2023-07-24 PROCEDURE — 85347 COAGULATION TIME ACTIVATED: CPT

## 2023-07-24 PROCEDURE — 6360000002 HC RX W HCPCS

## 2023-07-24 PROCEDURE — 93010 ELECTROCARDIOGRAM REPORT: CPT | Performed by: INTERNAL MEDICINE

## 2023-07-24 RX ORDER — SODIUM CHLORIDE 9 MG/ML
INJECTION, SOLUTION INTRAVENOUS PRN
Status: DISCONTINUED | OUTPATIENT
Start: 2023-07-24 | End: 2023-07-24 | Stop reason: SDUPTHER

## 2023-07-24 RX ORDER — SODIUM CHLORIDE 0.9 % (FLUSH) 0.9 %
5-40 SYRINGE (ML) INJECTION EVERY 12 HOURS SCHEDULED
Status: DISCONTINUED | OUTPATIENT
Start: 2023-07-24 | End: 2023-07-24 | Stop reason: SDUPTHER

## 2023-07-24 RX ORDER — ATORVASTATIN CALCIUM 80 MG/1
80 TABLET, FILM COATED ORAL NIGHTLY
Status: DISCONTINUED | OUTPATIENT
Start: 2023-07-24 | End: 2023-07-25 | Stop reason: HOSPADM

## 2023-07-24 RX ORDER — HEPARIN SODIUM 1000 [USP'U]/ML
2000 INJECTION, SOLUTION INTRAVENOUS; SUBCUTANEOUS PRN
Status: DISCONTINUED | OUTPATIENT
Start: 2023-07-24 | End: 2023-07-25 | Stop reason: HOSPADM

## 2023-07-24 RX ORDER — NITROGLYCERIN 0.4 MG/1
0.4 TABLET SUBLINGUAL EVERY 5 MIN PRN
Status: DISCONTINUED | OUTPATIENT
Start: 2023-07-24 | End: 2023-07-25 | Stop reason: HOSPADM

## 2023-07-24 RX ORDER — DIPHENHYDRAMINE HYDROCHLORIDE 50 MG/ML
50 INJECTION INTRAMUSCULAR; INTRAVENOUS ONCE
Status: DISCONTINUED | OUTPATIENT
Start: 2023-07-24 | End: 2023-07-25 | Stop reason: HOSPADM

## 2023-07-24 RX ORDER — ACETAMINOPHEN 325 MG/1
650 TABLET ORAL EVERY 4 HOURS PRN
Status: DISCONTINUED | OUTPATIENT
Start: 2023-07-24 | End: 2023-07-25 | Stop reason: HOSPADM

## 2023-07-24 RX ORDER — SODIUM CHLORIDE 0.9 % (FLUSH) 0.9 %
5-40 SYRINGE (ML) INJECTION PRN
Status: DISCONTINUED | OUTPATIENT
Start: 2023-07-24 | End: 2023-07-24 | Stop reason: SDUPTHER

## 2023-07-24 RX ORDER — HEPARIN SODIUM 10000 [USP'U]/100ML
5-30 INJECTION, SOLUTION INTRAVENOUS CONTINUOUS
Status: DISCONTINUED | OUTPATIENT
Start: 2023-07-24 | End: 2023-07-25

## 2023-07-24 RX ORDER — SODIUM CHLORIDE 9 MG/ML
INJECTION, SOLUTION INTRAVENOUS PRN
Status: DISCONTINUED | OUTPATIENT
Start: 2023-07-24 | End: 2023-07-25 | Stop reason: HOSPADM

## 2023-07-24 RX ORDER — ASPIRIN 325 MG
325 TABLET ORAL ONCE
Status: COMPLETED | OUTPATIENT
Start: 2023-07-24 | End: 2023-07-24

## 2023-07-24 RX ORDER — ASPIRIN 81 MG/1
81 TABLET, CHEWABLE ORAL DAILY
Status: DISCONTINUED | OUTPATIENT
Start: 2023-07-25 | End: 2023-07-24 | Stop reason: SDUPTHER

## 2023-07-24 RX ORDER — CYCLOBENZAPRINE HCL 10 MG
5 TABLET ORAL 3 TIMES DAILY PRN
Status: DISCONTINUED | OUTPATIENT
Start: 2023-07-24 | End: 2023-07-24

## 2023-07-24 RX ORDER — OXYCODONE HYDROCHLORIDE AND ACETAMINOPHEN 5; 325 MG/1; MG/1
1 TABLET ORAL EVERY 4 HOURS PRN
Status: DISCONTINUED | OUTPATIENT
Start: 2023-07-24 | End: 2023-07-25 | Stop reason: HOSPADM

## 2023-07-24 RX ORDER — HEPARIN SODIUM 1000 [USP'U]/ML
4000 INJECTION, SOLUTION INTRAVENOUS; SUBCUTANEOUS PRN
Status: DISCONTINUED | OUTPATIENT
Start: 2023-07-24 | End: 2023-07-25 | Stop reason: HOSPADM

## 2023-07-24 RX ADMIN — METOPROLOL SUCCINATE 25 MG: 25 TABLET, EXTENDED RELEASE ORAL at 08:28

## 2023-07-24 RX ADMIN — HEPARIN SODIUM 14.3 UNITS/KG/HR: 10000 INJECTION, SOLUTION INTRAVENOUS at 16:48

## 2023-07-24 RX ADMIN — IOPAMIDOL 60 ML: 755 INJECTION, SOLUTION INTRAVENOUS at 09:30

## 2023-07-24 RX ADMIN — HYDROCORTISONE SODIUM SUCCINATE 200 MG: 100 INJECTION, POWDER, FOR SOLUTION INTRAMUSCULAR; INTRAVENOUS at 08:29

## 2023-07-24 RX ADMIN — ASPIRIN 81 MG: 81 TABLET, COATED ORAL at 08:28

## 2023-07-24 RX ADMIN — SODIUM CHLORIDE, PRESERVATIVE FREE 10 ML: 5 INJECTION INTRAVENOUS at 19:38

## 2023-07-24 RX ADMIN — ATORVASTATIN CALCIUM 80 MG: 40 TABLET, FILM COATED ORAL at 19:38

## 2023-07-24 RX ADMIN — SODIUM CHLORIDE, PRESERVATIVE FREE 10 ML: 5 INJECTION INTRAVENOUS at 08:36

## 2023-07-24 RX ADMIN — AMIODARONE HYDROCHLORIDE 200 MG: 200 TABLET ORAL at 19:38

## 2023-07-24 RX ADMIN — ASPIRIN 325 MG: 325 TABLET ORAL at 08:28

## 2023-07-24 RX ADMIN — TICAGRELOR 90 MG: 90 TABLET ORAL at 19:38

## 2023-07-24 RX ADMIN — SODIUM CHLORIDE: 9 INJECTION, SOLUTION INTRAVENOUS at 09:38

## 2023-07-24 RX ADMIN — AMIODARONE HYDROCHLORIDE 200 MG: 200 TABLET ORAL at 08:28

## 2023-07-24 ASSESSMENT — PAIN SCALES - GENERAL: PAINLEVEL_OUTOF10: 0

## 2023-07-24 NOTE — FLOWSHEET NOTE
07/24/23 174   Safe Environment   Safety Measures Call light within reach;Standard Safety Measures  (Virtual nurse safety round completed.)      Patient is awake and  alert and answers questions. No distress noted.

## 2023-07-24 NOTE — BRIEF OP NOTE
[facility]  Sedation/Analgesia Post Sedation Record    Pt Name: Waqas Blanco  Account number: [de-identified]  MRN: [id number]  YOB: 1940  Procedure Performed By: MD MD Damien Wilkes [facility]  Primary Care Physician: Marla Jackson MD  Date: 7/24/2023    POST-PROCEDURE    Physicians/Assistants: MD MD Damien Wilkes RPVI    Procedure Performed:PCI      Sedation/Anesthesia: Versed/ Fentanyl and 2% xylocaine local anesthesia. Estimated Blood Loss: < 50 ml. Specimens Removed: None         Disposition of Specimen: N/A        Complications: No Immediate Complications.        Post-procedure Diagnosis/Findings:       PCI of the LAD x 2 MD MD Damien Ha, MANUEL  Electronically signed 7/24/2023 at 9:21 AM  Interventional Cardiology

## 2023-07-24 NOTE — FLOWSHEET NOTE
07/24/23 1017   Safe Environment   Safety Measures Bed in low position;Call light within reach; Family at bedside;Nurse at bedside;Side rails X 2;Standard Safety Measures  (Virtual nurse safety round completed.)     Patient is awake and  alert and answers questions. Bedside nurse is in the room.

## 2023-07-24 NOTE — CARE COORDINATION
Life Vest orders faxed to [facility] [phone]. Notified Benjaminyusuf Kalyn rep at [phone].   Electronically signed by Arden Hilario RN on 7/24/2023 at 9:28 AM

## 2023-07-24 NOTE — PROCEDURES
[place] [address]                            CARDIAC CATHETERIZATION    PATIENT NAME: Radha Godoy                     :        1940  MED REC NO:   [id number]                           ROOM:       0012  ACCOUNT NO:   [de-identified]                           ADMIT DATE: 2023  PROVIDER:     Enrrique Andino MD    DATE OF PROCEDURE:  2023    CARDIAC CATHETERIZATION    INDICATIONS:  NYHA class III CHF, not a candidate for open heart  surgery, severe symptomatic aortic stenosis, ischemic and valvular  cardiomyopathy with severe LAD stenosis. DESCRIPTION OF PROCEDURE:  After informed consent was obtained from the  patient, he was brought to the cardiac catheterization laboratory and  prepped in sterile fashion. Right radial artery was chosen as the  primary point of access. Pre-procedure timeout was completed. After  infiltration of right wrist with 2% lidocaine using micropuncture,  modified Seldinger technique under fluoroscopic guidance and ultrasound  guidance, I was able to insert 6-Thai Slender sheath into the radial  artery. Standard antispasmodic/antithrombotic cocktail was given  intraarterially. We then proceeded with intervention. We had a  preprocedure angiography performed. CT Surgery had already seen the  patient and deeming the patient not a candidate for surgery. Plan is to  proceed with intervention of the LAD, which gave large left-to-right  collaterals and proceed with TAVR. We cannulated the left main with a  6-Thai EBU 3.75 guiding catheter. Heparin IV was given. ACT was  confirmed to be above 250 seconds. I wired the lesion using a  Runthrough wire. I predilated the lesion using a 2.5 NC balloon and  stented the lesion using 2.5 x 38 Schuyler [place] JOSESITO distally and then  used a 3.5 x 26 proximally, deployed both stents in an overlapping  fashion.   I post dilated the stent distally

## 2023-07-24 NOTE — FLOWSHEET NOTE
07/24/23 1245   Safe Environment   Safety Measures Family at bedside;Standard Safety Measures  (Virtual nurse safety round completed.)     Patient sitting in chair. Patient is awake and  alert and answers questions. No distress noted.

## 2023-07-24 NOTE — CARE COORDINATION
7/24/23, 2:04 PM EDT    DISCHARGE ON GOING EVALUATION    [facility] day: 6  Location: -12/012-A Reason for admit: Atrial fibrillation [facility]) [I48.91]   Procedure:   7/20 Cardiac cath   7/20 DON pending EF 30-35%  7/22 VL Carotid Bilateral  Atheromatosis of both carotid bulbs without evidence of a hemodynamically significant stenosis. 7/24 PCI  Barriers to Discharge: Creatinine 1.4, INR 1.22, Nephrology following, telemetry, heart cath site check, IV fluids, Cordarone, Asa, Lipitor, Toprol, Nitrostat prn, Zofran. Cardiology following. PCP: Mario Trejo MD  Readmission Risk Score: 13.3%  Patient Goals/Plan/Treatment Preferences: Plan is home with spouse. Denies needs. Life Vest ordered.

## 2023-07-25 ENCOUNTER — TELEPHONE (OUTPATIENT)
Dept: PHARMACY | Age: 83
End: 2023-07-25

## 2023-07-25 ENCOUNTER — TELEPHONE (OUTPATIENT)
Dept: CARDIOLOGY CLINIC | Age: 83
End: 2023-07-25

## 2023-07-25 VITALS
OXYGEN SATURATION: 100 % | HEIGHT: 68 IN | WEIGHT: 177.69 LBS | RESPIRATION RATE: 17 BRPM | TEMPERATURE: 97.5 F | SYSTOLIC BLOOD PRESSURE: 138 MMHG | HEART RATE: 66 BPM | DIASTOLIC BLOOD PRESSURE: 81 MMHG | BODY MASS INDEX: 26.93 KG/M2

## 2023-07-25 DIAGNOSIS — I35.0 NONRHEUMATIC AORTIC VALVE STENOSIS: Primary | ICD-10-CM

## 2023-07-25 LAB
ANION GAP SERPL CALC-SCNC: 11 MEQ/L (ref 8–16)
BUN SERPL-MCNC: 22 MG/DL (ref 7–22)
CALCIUM SERPL-MCNC: 8.3 MG/DL (ref 8.5–10.5)
CHLORIDE SERPL-SCNC: 108 MEQ/L (ref 98–111)
CHOLEST SERPL-MCNC: 121 MG/DL (ref 100–199)
CO2 SERPL-SCNC: 19 MEQ/L (ref 23–33)
CREAT SERPL-MCNC: 1.4 MG/DL (ref 0.4–1.2)
DEPRECATED RDW RBC AUTO: 46.5 FL (ref 35–45)
ERYTHROCYTE [DISTWIDTH] IN BLOOD BY AUTOMATED COUNT: 14.3 % (ref 11.5–14.5)
GFR SERPL CREATININE-BSD FRML MDRD: 50 ML/MIN/1.73M2
GLUCOSE SERPL-MCNC: 138 MG/DL (ref 70–108)
HCT VFR BLD AUTO: 37.6 % (ref 42–52)
HDLC SERPL-MCNC: 42 MG/DL
HEPARIN UNFRACTIONATED: 0.36 U/ML (ref 0.3–0.7)
HEPARIN UNFRACTIONATED: 0.49 U/ML (ref 0.3–0.7)
HEPARIN UNFRACTIONATED: 0.51 U/ML (ref 0.3–0.7)
HGB BLD-MCNC: 12 GM/DL (ref 14–18)
LDLC SERPL CALC-MCNC: 66 MG/DL
MCH RBC QN AUTO: 28.9 PG (ref 26–33)
MCHC RBC AUTO-ENTMCNC: 31.9 GM/DL (ref 32.2–35.5)
MCV RBC AUTO: 90.6 FL (ref 80–94)
PLATELET # BLD AUTO: 187 THOU/MM3 (ref 130–400)
PMV BLD AUTO: 10.7 FL (ref 9.4–12.4)
POTASSIUM SERPL-SCNC: 3.9 MEQ/L (ref 3.5–5.2)
RBC # BLD AUTO: 4.15 MILL/MM3 (ref 4.7–6.1)
SODIUM SERPL-SCNC: 138 MEQ/L (ref 135–145)
TRIGL SERPL-MCNC: 64 MG/DL (ref 0–199)
WBC # BLD AUTO: 9.5 THOU/MM3 (ref 4.8–10.8)

## 2023-07-25 PROCEDURE — 36415 COLL VENOUS BLD VENIPUNCTURE: CPT

## 2023-07-25 PROCEDURE — 80048 BASIC METABOLIC PNL TOTAL CA: CPT

## 2023-07-25 PROCEDURE — 6370000000 HC RX 637 (ALT 250 FOR IP): Performed by: INTERNAL MEDICINE

## 2023-07-25 PROCEDURE — 85520 HEPARIN ASSAY: CPT

## 2023-07-25 PROCEDURE — 85027 COMPLETE CBC AUTOMATED: CPT

## 2023-07-25 PROCEDURE — 6370000000 HC RX 637 (ALT 250 FOR IP): Performed by: NURSE PRACTITIONER

## 2023-07-25 PROCEDURE — 80061 LIPID PANEL: CPT

## 2023-07-25 PROCEDURE — 2580000003 HC RX 258: Performed by: NURSE PRACTITIONER

## 2023-07-25 PROCEDURE — 6370000000 HC RX 637 (ALT 250 FOR IP): Performed by: PHYSICIAN ASSISTANT

## 2023-07-25 PROCEDURE — 99232 SBSQ HOSP IP/OBS MODERATE 35: CPT | Performed by: INTERNAL MEDICINE

## 2023-07-25 RX ORDER — AMIODARONE HYDROCHLORIDE 200 MG/1
200 TABLET ORAL DAILY
Qty: 30 TABLET | Refills: 1 | Status: SHIPPED | OUTPATIENT
Start: 2023-07-27

## 2023-07-25 RX ORDER — ASPIRIN 81 MG/1
81 TABLET ORAL DAILY
Qty: 14 TABLET | Refills: 0 | Status: SHIPPED | OUTPATIENT
Start: 2023-07-26

## 2023-07-25 RX ORDER — METOPROLOL SUCCINATE 25 MG/1
25 TABLET, EXTENDED RELEASE ORAL DAILY
Qty: 30 TABLET | Refills: 3 | Status: SHIPPED | OUTPATIENT
Start: 2023-07-26

## 2023-07-25 RX ORDER — ATORVASTATIN CALCIUM 80 MG/1
80 TABLET, FILM COATED ORAL NIGHTLY
Qty: 30 TABLET | Refills: 3 | Status: SHIPPED | OUTPATIENT
Start: 2023-07-25

## 2023-07-25 RX ADMIN — SODIUM CHLORIDE: 9 INJECTION, SOLUTION INTRAVENOUS at 08:39

## 2023-07-25 RX ADMIN — ASPIRIN 81 MG: 81 TABLET, COATED ORAL at 08:39

## 2023-07-25 RX ADMIN — APIXABAN 5 MG: 5 TABLET, FILM COATED ORAL at 13:02

## 2023-07-25 RX ADMIN — AMIODARONE HYDROCHLORIDE 200 MG: 200 TABLET ORAL at 08:39

## 2023-07-25 RX ADMIN — TICAGRELOR 90 MG: 90 TABLET ORAL at 08:39

## 2023-07-25 RX ADMIN — METOPROLOL SUCCINATE 25 MG: 25 TABLET, EXTENDED RELEASE ORAL at 08:39

## 2023-07-25 NOTE — PLAN OF CARE
Problem: Discharge Planning  Goal: Discharge to home or other facility with appropriate resources  7/22/2023 2010 by Sonam Burleson RN  Outcome: Progressing  Flowsheets (Taken 7/22/2023 2010)  Discharge to home or other facility with appropriate resources:   Identify barriers to discharge with patient and caregiver   Arrange for needed discharge resources and transportation as appropriate   Identify discharge learning needs (meds, wound care, etc)   Refer to discharge planning if patient needs post-hospital services based on physician order or complex needs related to functional status, cognitive ability or social support system     Problem: Safety - Adult  Goal: Free from fall injury  7/22/2023 2010 by Sonam Burleson RN  Outcome: Progressing  Note: Patient remains free from falls this shift. Fall precautions in place with bed/chair exit alarmed. Fall sign posted and fall armband in place. Nonskid footwear used with transferring. Educated patient to use call light when in need of staff assistance with transferring, ambulating, and other activities of daily living. Patient appropriately uses call light this shift.         Problem: Respiratory - Adult  Goal: Achieves optimal ventilation and oxygenation  7/22/2023 2010 by Sonam Burleson RN  Outcome: Progressing  Flowsheets (Taken 7/22/2023 2010)  Achieves optimal ventilation and oxygenation:   Assess for changes in respiratory status   Assess for changes in mentation and behavior     Problem: Cardiovascular - Adult  Goal: Maintains optimal cardiac output and hemodynamic stability  7/22/2023 2010 by Sonam Burleson RN  Outcome: Progressing  Flowsheets (Taken 7/22/2023 2010)  Maintains optimal cardiac output and hemodynamic stability:   Monitor blood pressure and heart rate   Assess for signs of decreased cardiac output   Administer vasoactive medications as ordered     Problem: Cardiovascular - Adult  Goal: Absence of cardiac dysrhythmias or at baseline  7/22/2023
Problem: Discharge Planning  Goal: Discharge to home or other facility with appropriate resources  7/24/2023 2300 by [place], RN  Outcome: Progressing  7/24/2023 1226 by Gus Short RN  Outcome: Progressing  Flowsheets (Taken 7/24/2023 1226)  Discharge to home or other facility with appropriate resources:   Identify barriers to discharge with patient and caregiver   Arrange for needed discharge resources and transportation as appropriate   Identify discharge learning needs (meds, wound care, etc)     Problem: Respiratory - Adult  Goal: Achieves optimal ventilation and oxygenation  7/24/2023 2300 by [place], RN  Outcome: Progressing  7/24/2023 1226 by Gus Short RN  Outcome: Progressing  Flowsheets (Taken 7/24/2023 1226)  Achieves optimal ventilation and oxygenation:   Assess for changes in respiratory status   Assess for changes in mentation and behavior     Problem: Cardiovascular - Adult  Goal: Maintains optimal cardiac output and hemodynamic stability  7/24/2023 2300 by [place], RN  Outcome: Progressing  7/24/2023 1226 by Gus Short RN  Outcome: Progressing  Flowsheets (Taken 7/24/2023 1226)  Maintains optimal cardiac output and hemodynamic stability:   Monitor blood pressure and heart rate   Monitor urine output and notify Licensed Independent Practitioner for values outside of normal range  Goal: Absence of cardiac dysrhythmias or at baseline  7/24/2023 2300 by [place], RN  Outcome: Progressing  7/24/2023 1226 by Gus hSort RN  Outcome: Progressing  Flowsheets (Taken 7/24/2023 1226)  Absence of cardiac dysrhythmias or at baseline:   Monitor cardiac rate and rhythm   Administer antiarrhythmia medication and electrolyte replacement as ordered     Problem: Skin/Tissue Integrity - Adult  Goal: Skin integrity remains intact  7/24/2023 2300 by [place], RN  Outcome: Progressing  7/24/2023 1226 by Gus Short RN  Outcome: Progressing  Flowsheets (Taken 7/24/2023
Problem: Discharge Planning  Goal: Discharge to home or other facility with appropriate resources  Outcome: Progressing  Discharge to home or other facility with appropriate resources:   Identify barriers to discharge with patient and caregiver   Arrange for needed discharge resources and transportation as appropriate   Identify discharge learning needs (meds, wound care, etc)   Refer to discharge planning if patient needs post-hospital services based on physician order or complex needs related to functional status, cognitive ability or social support system     Problem: Safety - Adult  Goal: Free from fall injury  Outcome: Progressing  Note: Patient remains free from falls this shift. Fall precautions in place with bed/chair exit alarmed. Fall sign posted and fall armband in place. Nonskid footwear used with transferring. Educated patient to use call light when in need of staff assistance with transferring, ambulating, and other activities of daily living. Patient appropriately uses call light this shift.         Problem: Respiratory - Adult  Goal: Achieves optimal ventilation and oxygenation  Outcome: Progressing  Achieves optimal ventilation and oxygenation:   Assess for changes in respiratory status   Assess for changes in mentation and behavior     Problem: Cardiovascular - Adult  Goal: Maintains optimal cardiac output and hemodynamic stability  Outcome: Progressing  Maintains optimal cardiac output and hemodynamic stability:   Monitor blood pressure and heart rate   Assess for signs of decreased cardiac output   Administer vasoactive medications as ordered     Problem: Cardiovascular - Adult  Goal: Absence of cardiac dysrhythmias or at baseline  Outcome: Progressing  Absence of cardiac dysrhythmias or at baseline:   Monitor cardiac rate and rhythm   Assess for signs of decreased cardiac output   Administer antiarrhythmia medication and electrolyte replacement as ordered     Problem: Skin/Tissue Integrity -
Problem: Discharge Planning  Goal: Discharge to home or other facility with appropriate resources  Outcome: Progressing  Flowsheets (Taken 7/19/2023 1606)  Discharge to home or other facility with appropriate resources:   Identify barriers to discharge with patient and caregiver   Arrange for needed discharge resources and transportation as appropriate   Identify discharge learning needs (meds, wound care, etc)     Problem: Safety - Adult  Goal: Free from fall injury  Outcome: Progressing  Flowsheets (Taken 7/19/2023 1606)  Free From Fall Injury: Instruct family/caregiver on patient safety     Problem: Respiratory - Adult  Goal: Achieves optimal ventilation and oxygenation  Outcome: Progressing  Flowsheets (Taken 7/19/2023 1606)  Achieves optimal ventilation and oxygenation:   Assess for changes in respiratory status   Assess and instruct to report shortness of breath or any respiratory difficulty     Problem: Cardiovascular - Adult  Goal: Maintains optimal cardiac output and hemodynamic stability  Outcome: Progressing  Flowsheets (Taken 7/19/2023 1606)  Maintains optimal cardiac output and hemodynamic stability:   Monitor blood pressure and heart rate   Assess for signs of decreased cardiac output     Problem: Cardiovascular - Adult  Goal: Absence of cardiac dysrhythmias or at baseline  Outcome: Progressing  Flowsheets (Taken 7/19/2023 1606)  Absence of cardiac dysrhythmias or at baseline:   Monitor cardiac rate and rhythm   Assess for signs of decreased cardiac output   Administer antiarrhythmia medication and electrolyte replacement as ordered     Problem: Skin/Tissue Integrity - Adult  Goal: Skin integrity remains intact  Outcome: Progressing  Flowsheets (Taken 7/19/2023 1606)  Skin Integrity Remains Intact: Monitor for areas of redness and/or skin breakdown     Problem: Skin/Tissue Integrity - Adult  Goal: Incisions, wounds, or drain sites healing without S/S of infection  Outcome: Progressing  Flowsheets
Problem: Discharge Planning  Goal: Discharge to home or other facility with appropriate resources  Outcome: Progressing  Flowsheets (Taken 7/24/2023 1226)  Discharge to home or other facility with appropriate resources:   Identify barriers to discharge with patient and caregiver   Arrange for needed discharge resources and transportation as appropriate   Identify discharge learning needs (meds, wound care, etc)     Problem: Safety - Adult  Goal: Free from fall injury  Outcome: Progressing  Flowsheets (Taken 7/24/2023 1226)  Free From Fall Injury: Instruct family/caregiver on patient safety     Problem: Respiratory - Adult  Goal: Achieves optimal ventilation and oxygenation  Outcome: Progressing  Flowsheets (Taken 7/24/2023 1226)  Achieves optimal ventilation and oxygenation:   Assess for changes in respiratory status   Assess for changes in mentation and behavior     Problem: Cardiovascular - Adult  Goal: Maintains optimal cardiac output and hemodynamic stability  Outcome: Progressing  Flowsheets (Taken 7/24/2023 1226)  Maintains optimal cardiac output and hemodynamic stability:   Monitor blood pressure and heart rate   Monitor urine output and notify Licensed Independent Practitioner for values outside of normal range     Problem: Cardiovascular - Adult  Goal: Absence of cardiac dysrhythmias or at baseline  Outcome: Progressing  Flowsheets (Taken 7/24/2023 1226)  Absence of cardiac dysrhythmias or at baseline:   Monitor cardiac rate and rhythm   Administer antiarrhythmia medication and electrolyte replacement as ordered     Problem: Skin/Tissue Integrity - Adult  Goal: Skin integrity remains intact  Outcome: Progressing  Flowsheets (Taken 7/24/2023 1226)  Skin Integrity Remains Intact: Monitor for areas of redness and/or skin breakdown     Problem: Skin/Tissue Integrity - Adult  Goal: Incisions, wounds, or drain sites healing without S/S of infection  Outcome: Progressing  Flowsheets (Taken 7/24/2023
hygiene technique   Identify and instruct in appropriate isolation precautions for identified infection/condition     Problem: Metabolic/Fluid and Electrolytes - Adult  Goal: Electrolytes maintained within normal limits  Outcome: Progressing  Flowsheets (Taken 7/19/2023 1606 by Price Cuevas RN)  Electrolytes maintained within normal limits:   Monitor labs and assess patient for signs and symptoms of electrolyte imbalances   Administer electrolyte replacement as ordered     Problem: Pain  Goal: Verbalizes/displays adequate comfort level or baseline comfort level  Outcome: Progressing  Flowsheets (Taken 7/19/2023 1606 by Price Cuevas RN)  Verbalizes/displays adequate comfort level or baseline comfort level:   Encourage patient to monitor pain and request assistance   Assess pain using appropriate pain scale   Administer analgesics based on type and severity of pain and evaluate response   Implement non-pharmacological measures as appropriate and evaluate response     Problem: ABCDS Injury Assessment  Goal: Absence of physical injury  Outcome: Progressing  Flowsheets (Taken 7/19/2023 1606 by Price Cuevas RN)  Absence of Physical Injury: Implement safety measures based on patient assessment     Problem: Skin/Tissue Integrity  Goal: Absence of new skin breakdown  Description: 1. Monitor for areas of redness and/or skin breakdown  2. Assess vascular access sites hourly  3. Every 4-6 hours minimum:  Change oxygen saturation probe site  4. Every 4-6 hours:  If on nasal continuous positive airway pressure, respiratory therapy assess nares and determine need for appliance change or resting period.   Outcome: Progressing     Problem: Nutrition Deficit:  Goal: Optimize nutritional status  Outcome: Progressing  Flowsheets (Taken 7/19/2023 1606 by Price Cuevas RN)  Nutrient intake appropriate for improving, restoring, or maintaining nutritional needs:   Assess nutritional status and recommend course of action

## 2023-07-25 NOTE — CARE COORDINATION
7/25/23, 1:12 PM EDT    Patient goals/plan/ treatment preferences discussed by  and . Patient goals/plan/ treatment preferences reviewed with patient/ family. Patient/ family verbalize understanding of discharge plan and are in agreement with goal/plan/treatment preferences. Understanding was demonstrated using the teach back method. AVS provided by RN at time of discharge, which includes all necessary medical information pertaining to the patients current course of illness, treatment, post-discharge goals of care, and treatment preferences. Declined LV. Plans home with wife.      Services At/After Discharge: None       IMM Letter  IMM Letter given to Patient/Family/Significant other/Guardian/POA/by[de-identified] CM  IMM Letter date given[de-identified] 07/21/23  IMM Letter time given[de-identified] 6189

## 2023-07-25 NOTE — TELEPHONE ENCOUNTER
Saw patient in house. Patient and Nate Jimenez have decided to proceed with TAVR procedure. TAVR folder given to patient and family at bedside. Discussed procedure at length including workup and discharge instructions. [id number] completed and scanned into the chart. Orders received from Dr. Price Saha to obtain a  TAVR CTA, Dental Clearance and CHF appointment for optimization. CT/CV Surgery seen as inpatient 7/21/23  CTA scheduled on 8/7/23 at 1300  Carotid US completed as inpt 7/22/23  Cardiac Catheterization completed with Dr. Price Saha on 7/22/23   CHF appointment scheduled on 8/7/23 at 1000. Dental Appointment to be scheduled per family. Pt sees Dr. Padmini Olson in [place], [place]. Dr. William Hogan, anything needed for renal protection prior to CTAs of chest and abdomen for TAVR workup?

## 2023-07-25 NOTE — DISCHARGE SUMMARY
Hospital Medicine Discharge Summary      Patient Identification:   Toni Albrecht   : 1940  MRN: [id number]   Account: [de-identified]      Patient's PCP: Williams Valdivia MD    Admit Date: 2023     Discharge Date: 23    Admitting Physician: No admitting provider for patient encounter. Discharge Physician: Melissa Earl PA-C     Toni Albrecht is a 81 y.o. male admitted to [facility] on 2023. HPI On Admission From H&P:    81 y.o. male who presented to [facility] with complaints of general malaise. Patient reports feeling unwell for the last 3 weeks. He reports feeling like he thought he had the flu. Patient thought symptoms would resolve after 7-10 days but reports they did not. Patient reports feeling pretty good upon HPI collection. [facility] Patient endorses some shortness of breath. He denies chest pain. He reports no appetite. Echo obtained at [facility] facility with reduced estimated EF of 25% and severe aortic stenosis noted. Patient admitted to the hospitalist service. Cardiology consulted for further evaluation.     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

## 2023-07-25 NOTE — DISCHARGE INSTRUCTIONS
Discharge Instructions for Radial Heart Catherization    1. Take it easy for 3-4 days. 2.  No driving for 2 days. 3.  No lifting of 5 lbs or more for 5 days with the affected arm. 4.  Can shower after 24 hours. 5.  Remove arm board after 24 hours. 6.  Apply a band aid to the insertion site daily for 5 days. May apply antibiotic ointment if desired, but not necessary. Wash site daily with soap and water. 7.  No creams, ointments, or powders near the insertion site. 8.  No tub baths, swimming, hot tubs, or hand washing dishes for 1 week. 9.  Watch for signs of infection (redness, warmth, swelling, or pus drainage) or coolness of extremity and call physician if this occurs  10. If bleeding occurs from insertion site, apply pressure and call 911.      F/up chf clinic 1 week  F/up dr dhaliwal 2 weeks  Stop aspirin 2 weeks on august 7

## 2023-07-26 NOTE — TELEPHONE ENCOUNTER
Called patient and reviewed CTAs and appointment with Adolfo Castillo with CHF Clinic. Instructions provided for CTAs including instructions from Dr. Roxanna Humphrey. All questions answered. Appointment reminder and instructions also mailed out.

## 2023-07-27 NOTE — TELEPHONE ENCOUNTER
Received phone call from patient with concerns regarding proceeding with TAVR procedure. Discussed procedure, aortic stenosis and heart failure symptoms at length with patient. Yasmany states that he is unsure at this point if he wants any intervention and discussed medical management. Pt has persistent leg swelling. Pt was sent home with no diuretic medications, minnie hose etc.  Pt states that he weighs himself daily and has not gained weight. He states he follows a diet and watches fluid intake. TAVR CTAs cancelled per pt request.  Educated patient of CHF clinic, states he would like to keep appointment 8/7. Offered to get patient in sooner and patient declined. Offered appointment with Dr. Blanka Brock to discuss further and pt declined, he would like to discuss with family. Reiterated that the decision is entirely his and we will be here to assist in whatever way we can.

## 2023-07-31 NOTE — TELEPHONE ENCOUNTER
Received phone call from patient stating that he has discussed with his wife and 3 sons and they would like to proceed with TAVR procedure. Orders received from Dr. Jodee Stallworth to obtain a TAVR CTA, Dental Clearance and CHF appointment for optimization. CT/CV Surgery seen as inpatient 7/21/23    CTA scheduled on 8/7/23 at 1300    Carotid US completed as inpt 7/22/23    Cardiac Catheterization completed with Dr. Jodee Stallworth on 7/22/23     CHF appointment scheduled on 8/7/23 at 1000. Dental Appointment scheduled 8/1 @ 0830 with Dr. Dilshad Bradford at [facility].  Clearance form faxed to their office. Instructions for CTAs reviewed with patient including recommendations from Dr. Houston Schultz to hold diuretics. Pt states he is not currently on any diuretics. Encouraged oral intake before and after scans but NPO 4 hours prior. Pt verbalized understanding.

## 2023-08-01 NOTE — TELEPHONE ENCOUNTER
STS completed:  Operative Mortality 3.95%  Morbidity & Mortality 18.4%  Stroke 1.98%  Renal Failure 2.91%  Reoperation 4.73%  Prolonged Ventilation 9.21%  Deep Sternal Wound Infection 0.046%  [address] [facility] Stay (>14 days) 10.1%  [facility] Stay (<6 days)* 23.5%    Also printed and scanned in to media tab

## 2023-08-07 ENCOUNTER — HOSPITAL ENCOUNTER (OUTPATIENT)
Dept: CT IMAGING | Age: 83
Discharge: HOME | End: 2023-08-07
Payer: MEDICARE

## 2023-08-07 ENCOUNTER — OFFICE VISIT (OUTPATIENT)
Dept: CARDIOLOGY CLINIC | Age: 83
End: 2023-08-07
Payer: MEDICARE

## 2023-08-07 ENCOUNTER — TELEPHONE (OUTPATIENT)
Dept: PHARMACY | Age: 83
End: 2023-08-07

## 2023-08-07 VITALS
HEART RATE: 67 BPM | HEIGHT: 70 IN | BODY MASS INDEX: 24.22 KG/M2 | SYSTOLIC BLOOD PRESSURE: 140 MMHG | DIASTOLIC BLOOD PRESSURE: 78 MMHG | WEIGHT: 169.2 LBS | OXYGEN SATURATION: 98 %

## 2023-08-07 DIAGNOSIS — I48.0 PAROXYSMAL ATRIAL FIBRILLATION (HCC): ICD-10-CM

## 2023-08-07 DIAGNOSIS — I50.22 CHF NYHA CLASS II, CHRONIC, SYSTOLIC (HCC): Primary | ICD-10-CM

## 2023-08-07 DIAGNOSIS — I25.10 CORONARY ARTERY DISEASE INVOLVING NATIVE HEART WITHOUT ANGINA PECTORIS, UNSPECIFIED VESSEL OR LESION TYPE: ICD-10-CM

## 2023-08-07 DIAGNOSIS — I35.0 NONRHEUMATIC AORTIC VALVE STENOSIS: ICD-10-CM

## 2023-08-07 PROCEDURE — 74174 CTA ABD&PLVS W/CONTRAST: CPT

## 2023-08-07 PROCEDURE — 1111F DSCHRG MED/CURRENT MED MERGE: CPT | Performed by: NURSE PRACTITIONER

## 2023-08-07 PROCEDURE — 1123F ACP DISCUSS/DSCN MKR DOCD: CPT | Performed by: NURSE PRACTITIONER

## 2023-08-07 PROCEDURE — 99214 OFFICE O/P EST MOD 30 MIN: CPT | Performed by: NURSE PRACTITIONER

## 2023-08-07 PROCEDURE — 71275 CT ANGIOGRAPHY CHEST: CPT

## 2023-08-07 PROCEDURE — 6360000004 HC RX CONTRAST MEDICATION: Performed by: INTERNAL MEDICINE

## 2023-08-07 PROCEDURE — G8420 CALC BMI NORM PARAMETERS: HCPCS | Performed by: NURSE PRACTITIONER

## 2023-08-07 PROCEDURE — G8427 DOCREV CUR MEDS BY ELIG CLIN: HCPCS | Performed by: NURSE PRACTITIONER

## 2023-08-07 PROCEDURE — 1036F TOBACCO NON-USER: CPT | Performed by: NURSE PRACTITIONER

## 2023-08-07 RX ADMIN — IOPAMIDOL 125 ML: 755 INJECTION, SOLUTION INTRAVENOUS at 11:33

## 2023-08-07 ASSESSMENT — ENCOUNTER SYMPTOMS
SHORTNESS OF BREATH: 1
ABDOMINAL DISTENTION: 0
COUGH: 0

## 2023-08-07 NOTE — TELEPHONE ENCOUNTER
Reached out to Yasmany informing that I would be attempting to enroll him into patient assistance programs for Brilinta(already approved), Eliquis and Jardiance(pending submission once patient has establishing visit with Omer Morrison). Patient informed it was his goal to \"get off these meds\". I informed that in the case the prescriber wants him to stay on these medications, it's best I try to get assistance now so we can get approval or denial before he is out of medication and we know how to move forward. Yasmany agreed and stated he would bring a copy of his and his wife's SSI statement so I can send with applications.  I will submit prescription requests to Omer Morrison office on the date of patient establishing appointment (8-7-2023) Nani Munroe, [address] [facility] (ext. [phone]) 8/7/2023,11:24 AM

## 2023-08-07 NOTE — TELEPHONE ENCOUNTER
Received phone call from Randy bain that he has not yet located he and his spouses' SSI statement that I need to send in with his applications for assistance. Yasmany asked if this was an emergency and I informed him it was an urgent matter that I receive the SSI statements because they will hold up his prescription process and he could potentially go without medication if I do not have them received in a timely manner.  I provided my fax number [phone] to Yasmany and he voiced understanding and said he would try to have to me by the end of this week at the latest.   [address] [facility] (ext. [phone]) 8/7/2023,3:16 PM

## 2023-08-07 NOTE — PATIENT INSTRUCTIONS
You may receive a survey regarding the care you received during your visit. Your input is valuable to us. We encourage you to complete and return your survey. We hope you will choose us in the future for your healthcare needs. Your nurses today were Keri and [employer].   Office hours:   Mon-Thurs 8-4:30  Friday 8-12  Phone: [phone]    Continue:  Continue current medications  Daily weights and record  Fluid restriction of 2 Liters per day  Limit sodium in diet to around 2915-2941 mg/day  Monitor BP  Activity as tolerated     Call the [address] for any of the following symptoms:   Weight gain of 2-3 pounds in 1 day or 5 pounds in 1 week  Increased shortness of breath  Shortness of breath while laying down  Cough  Chest pain  Swelling in feet, ankles or legs  Bloating in abdomen  Fatigue

## 2023-08-07 NOTE — PROGRESS NOTES
10/day  AA - none  Diuretic - none  Vasodilator - none  Other - [place]    HFrEF 30-35%, NYHA II  Severe AS (NATALY 0.4 cm, mean gradient 20 mmHg) - DSE showing LF-LG = TAVR workup  CAD s/p PCI LAD (7/2023)  PAF - NSR on last EKG - regular rhythm today. Stable, appears Euvolemic  Lab reviewed - stable  Repeat blood work in next week - BMP  BP/HR stable   No med changes today - pt does not have drug coverage to fully optimize meds. Working w/ Dwight Ebbs w/ Patient assistance. Continue diet/fluid adherence  Continue daily wts. Has CT TAVR today  Dental clearance done per pt  F/U w/ Cardiology  F/U in clinic post TAVR     Tolerating above noted HF meds, no ill side effects noted. Will continue to monitor kidney function and electrolytes. Will optimize as tolerated. Pt is compliant w/ medications. Total visit time of 40 minutes has been spent with patient on education of symptoms, management, medication, and plan of care; as well as review of chart: labs, ECHO, radiology reports, etc.   I personally spent more then 50% of the appt time face to face with the patient. Daily weights  Fluid restriction of 2 Liters per day  Limit sodium in diet to around 7802-7145 mg/day  Monitor BP  Activity as tolerated     Patient was instructed to call the [address] for any changes in symptoms as noted in AVS.      Return if symptoms worsen or fail to improve. or sooner if needed     Patient given educational materials - see patient instructions. We discussed the importance of weighing oneself and recording daily. We also discussed the importance of a low sodium diet, higher sodium foods to avoid and better low sodium food options. Patient verbalizes understanding of plan of care using teach back method, and is agreeable to the treatment plan.        Electronically signed by LEIF Lopez CNP on 8/7/2023 at 11:01 AM

## 2023-08-08 LAB
BUN BLDV-MCNC: 20 MG/DL
CALCIUM SERPL-MCNC: 8.9 MG/DL
CHLORIDE BLD-SCNC: 105 MMOL/L
CO2: 22 MMOL/L
CREAT SERPL-MCNC: 1.3 MG/DL
EGFR: 51
GLUCOSE BLD-MCNC: 121 MG/DL
POTASSIUM SERPL-SCNC: 4.6 MMOL/L
SODIUM BLD-SCNC: 139 MMOL/L

## 2023-08-09 NOTE — TELEPHONE ENCOUNTER
TAVR CTA results:  Inferiorly in the liver there is a hypervascular appearing area which is ill-defined. This is likely a small hemangioma.  There is a tiny peripheral low-density lesion anterior to this, likely a small simple cyst.

## 2023-08-10 ENCOUNTER — TELEPHONE (OUTPATIENT)
Dept: PHARMACY | Age: 83
End: 2023-08-10

## 2023-08-10 NOTE — TELEPHONE ENCOUNTER
Reached out to [employer] and informed he is approved for free Jardiance through [address] through 12/31/23 and will need to have someone at [facility] office help him with re-enrollment for the 2024 year when the time comes. Yasmany voiced understanding.  Lonnie Rayo CPhT - Prescription Assistance (ext. [phone]) 5/31/6044,8:04 PM

## 2023-08-14 ENCOUNTER — TELEPHONE (OUTPATIENT)
Dept: PHARMACY | Age: 83
End: 2023-08-14

## 2023-08-14 ENCOUNTER — TELEPHONE (OUTPATIENT)
Dept: CARDIOLOGY CLINIC | Age: 83
End: 2023-08-14

## 2023-08-14 NOTE — TELEPHONE ENCOUNTER
Received phone call from patient requesting a post hospital appointment with Dr. Yadira Metz. Pt was admitted with general malaise, was found o have severe AS, underwent R/LHC with stents to LAD. Pt was discharged from the hospital 7/25/23. Discharge instructions state the patient should have an appointment 4 weeks post d/c with BMP. Can we make an appointment for this patient to see Dr. Yadira Metz?

## 2023-08-14 NOTE — TELEPHONE ENCOUNTER
Called Yasmany and informed him he has been approved for Eliquis PAP through 12/31/23. Someone at [facility] office will need to help re-enroll patient along with Brilinchristofer and Octavia for the 2024 year. Informed Yasmany that he should be expecting to receive his shipment of Jardiance and Eliquis sometime over the next week. Yasmany voiced understanding.  Jose Weiss CPhT - Prescription Assistance (ext. [phone]) 8/14/2023,10:24 AM

## 2023-08-15 ENCOUNTER — OFFICE VISIT (OUTPATIENT)
Dept: NEPHROLOGY | Age: 83
End: 2023-08-15
Payer: MEDICARE

## 2023-08-15 VITALS
BODY MASS INDEX: 24.16 KG/M2 | OXYGEN SATURATION: 94 % | HEART RATE: 53 BPM | WEIGHT: 166 LBS | SYSTOLIC BLOOD PRESSURE: 138 MMHG | DIASTOLIC BLOOD PRESSURE: 69 MMHG

## 2023-08-15 DIAGNOSIS — N18.31 STAGE 3A CHRONIC KIDNEY DISEASE (HCC): Primary | ICD-10-CM

## 2023-08-15 DIAGNOSIS — I10 ESSENTIAL HYPERTENSION: ICD-10-CM

## 2023-08-15 PROCEDURE — 99213 OFFICE O/P EST LOW 20 MIN: CPT | Performed by: INTERNAL MEDICINE

## 2023-08-15 PROCEDURE — 3075F SYST BP GE 130 - 139MM HG: CPT | Performed by: INTERNAL MEDICINE

## 2023-08-15 PROCEDURE — 1036F TOBACCO NON-USER: CPT | Performed by: INTERNAL MEDICINE

## 2023-08-15 PROCEDURE — 1123F ACP DISCUSS/DSCN MKR DOCD: CPT | Performed by: INTERNAL MEDICINE

## 2023-08-15 PROCEDURE — G8427 DOCREV CUR MEDS BY ELIG CLIN: HCPCS | Performed by: INTERNAL MEDICINE

## 2023-08-15 PROCEDURE — 3078F DIAST BP <80 MM HG: CPT | Performed by: INTERNAL MEDICINE

## 2023-08-15 PROCEDURE — 1111F DSCHRG MED/CURRENT MED MERGE: CPT | Performed by: INTERNAL MEDICINE

## 2023-08-15 PROCEDURE — G8420 CALC BMI NORM PARAMETERS: HCPCS | Performed by: INTERNAL MEDICINE

## 2023-08-21 PROBLEM — Z01.818 PREOPERATIVE CLEARANCE: Status: RESOLVED | Noted: 2023-07-22 | Resolved: 2023-08-21

## 2023-08-21 RX ORDER — ATORVASTATIN CALCIUM 80 MG/1
80 TABLET, FILM COATED ORAL NIGHTLY
Qty: 30 TABLET | Refills: 3 | Status: SHIPPED | OUTPATIENT
Start: 2023-08-21

## 2023-08-21 RX ORDER — AMIODARONE HYDROCHLORIDE 200 MG/1
200 TABLET ORAL DAILY
Qty: 30 TABLET | Refills: 2 | Status: SHIPPED | OUTPATIENT
Start: 2023-08-21

## 2023-08-21 RX ORDER — METOPROLOL SUCCINATE 25 MG/1
25 TABLET, EXTENDED RELEASE ORAL DAILY
Qty: 30 TABLET | Refills: 3 | Status: SHIPPED | OUTPATIENT
Start: 2023-08-21

## 2023-08-25 ENCOUNTER — TELEPHONE (OUTPATIENT)
Dept: CARDIOLOGY CLINIC | Age: 83
End: 2023-08-25

## 2023-08-25 DIAGNOSIS — I35.0 SEVERE AORTIC VALVE STENOSIS: Primary | ICD-10-CM

## 2023-08-25 NOTE — TELEPHONE ENCOUNTER
Orders received from Dr. Price Saha to schedule the patient for a TAVR procedure and have the patient hold the follow medications the morning of the TAVR procedure: August 31st @ 1100am.    TAVR: August 31st at 1100 am with an arrival of 0800 am  Discharge home with Mathew Sy notified with Makayla Sierra TAVR instructions per Dr. Price Saha: have the patient be seen in the [facility] 7 days post TAVR, obtain a CBC, BMP and ECHO prior to the 30 day post TAVR follow up appointment. 7 day post TAVR appointment:9/5 at 1245 pm with Yesenia OH  CHF Clinic follow up appointment 9/14 at 0800 with Brittny Kunz CNP  CBC/BMP/ECHO:9/25 at 145 pm  30 day post TAVR appointment:9/28 at 1000 am with Radha Rodriguez MD    Primary Cardiologist:  Rajwinder Bansal can you prior auth this?

## 2023-08-28 NOTE — TELEPHONE ENCOUNTER
Received phone call back from Yasmany. Discussed holding losartan the day before and the day after TAVR procedure per Dr. Linnea Prado. Voiced understanding. All questions answered.

## 2023-08-30 ENCOUNTER — PREP FOR PROCEDURE (OUTPATIENT)
Dept: CARDIOLOGY | Age: 83
End: 2023-08-30

## 2023-08-30 RX ORDER — SODIUM CHLORIDE 0.9 % (FLUSH) 0.9 %
5-40 SYRINGE (ML) INJECTION PRN
Status: CANCELLED | OUTPATIENT
Start: 2023-08-30

## 2023-08-30 RX ORDER — SODIUM CHLORIDE 9 MG/ML
INJECTION, SOLUTION INTRAVENOUS PRN
Status: CANCELLED | OUTPATIENT
Start: 2023-08-30

## 2023-08-30 RX ORDER — SODIUM CHLORIDE 0.9 % (FLUSH) 0.9 %
5-40 SYRINGE (ML) INJECTION EVERY 12 HOURS SCHEDULED
Status: CANCELLED | OUTPATIENT
Start: 2023-08-30

## 2023-08-31 ENCOUNTER — HOSPITAL ENCOUNTER (INPATIENT)
Dept: INPATIENT UNIT | Age: 83
LOS: 1 days | Discharge: HOME | DRG: 267 | End: 2023-09-01
Attending: INTERNAL MEDICINE | Admitting: INTERNAL MEDICINE
Payer: MEDICARE

## 2023-08-31 PROBLEM — I35.0 SEVERE AORTIC STENOSIS: Status: ACTIVE | Noted: 2023-08-31

## 2023-08-31 LAB
ABO: NORMAL
ACTIVATED CLOTTING TIME: 353 SECONDS (ref 1–150)
ANION GAP SERPL CALC-SCNC: 13 MEQ/L (ref 8–16)
ANTIBODY SCREEN: NORMAL
APTT PPP: 39 SECONDS (ref 22–38)
BUN SERPL-MCNC: 20 MG/DL (ref 7–22)
CALCIUM SERPL-MCNC: 8.9 MG/DL (ref 8.5–10.5)
CHLORIDE SERPL-SCNC: 105 MEQ/L (ref 98–111)
CO2 SERPL-SCNC: 23 MEQ/L (ref 23–33)
CREAT SERPL-MCNC: 1.4 MG/DL (ref 0.4–1.2)
DEPRECATED RDW RBC AUTO: 51.4 FL (ref 35–45)
EKG ATRIAL RATE: 72 BPM
EKG P AXIS: 62 DEGREES
EKG P-R INTERVAL: 226 MS
EKG Q-T INTERVAL: 418 MS
EKG Q-T INTERVAL: 426 MS
EKG QRS DURATION: 138 MS
EKG QRS DURATION: 144 MS
EKG QTC CALCULATION (BAZETT): 466 MS
EKG QTC CALCULATION (BAZETT): 502 MS
EKG R AXIS: 10 DEGREES
EKG R AXIS: 37 DEGREES
EKG T AXIS: -49 DEGREES
EKG T AXIS: 116 DEGREES
EKG VENTRICULAR RATE: 72 BPM
EKG VENTRICULAR RATE: 87 BPM
ERYTHROCYTE [DISTWIDTH] IN BLOOD BY AUTOMATED COUNT: 15.4 % (ref 11.5–14.5)
GFR SERPL CREATININE-BSD FRML MDRD: 50 ML/MIN/1.73M2
GLUCOSE SERPL-MCNC: 104 MG/DL (ref 70–108)
HCT VFR BLD AUTO: 35.4 % (ref 42–52)
HGB BLD-MCNC: 11 GM/DL (ref 14–18)
INR PPP: 1.12 (ref 0.85–1.13)
MCH RBC QN AUTO: 28.5 PG (ref 26–33)
MCHC RBC AUTO-ENTMCNC: 31.1 GM/DL (ref 32.2–35.5)
MCV RBC AUTO: 91.7 FL (ref 80–94)
NT-PROBNP SERPL IA-MCNC: ABNORMAL PG/ML (ref 0–449)
PLATELET # BLD AUTO: 211 THOU/MM3 (ref 130–400)
PMV BLD AUTO: 10.2 FL (ref 9.4–12.4)
POTASSIUM SERPL-SCNC: 4.4 MEQ/L (ref 3.5–5.2)
RBC # BLD AUTO: 3.86 MILL/MM3 (ref 4.7–6.1)
RH FACTOR: NORMAL
SODIUM SERPL-SCNC: 141 MEQ/L (ref 135–145)
WBC # BLD AUTO: 6.7 THOU/MM3 (ref 4.8–10.8)

## 2023-08-31 PROCEDURE — 86901 BLOOD TYPING SEROLOGIC RH(D): CPT

## 2023-08-31 PROCEDURE — C1769 GUIDE WIRE: HCPCS

## 2023-08-31 PROCEDURE — 6360000002 HC RX W HCPCS

## 2023-08-31 PROCEDURE — 33361 REPLACE AORTIC VALVE PERQ: CPT

## 2023-08-31 PROCEDURE — 36415 COLL VENOUS BLD VENIPUNCTURE: CPT

## 2023-08-31 PROCEDURE — 85347 COAGULATION TIME ACTIVATED: CPT

## 2023-08-31 PROCEDURE — 86900 BLOOD TYPING SEROLOGIC ABO: CPT

## 2023-08-31 PROCEDURE — 83880 ASSAY OF NATRIURETIC PEPTIDE: CPT

## 2023-08-31 PROCEDURE — 80048 BASIC METABOLIC PNL TOTAL CA: CPT

## 2023-08-31 PROCEDURE — 6370000000 HC RX 637 (ALT 250 FOR IP)

## 2023-08-31 PROCEDURE — 2580000003 HC RX 258: Performed by: STUDENT IN AN ORGANIZED HEALTH CARE EDUCATION/TRAINING PROGRAM

## 2023-08-31 PROCEDURE — 93010 ELECTROCARDIOGRAM REPORT: CPT | Performed by: NUCLEAR MEDICINE

## 2023-08-31 PROCEDURE — C1760 CLOSURE DEV, VASC: HCPCS

## 2023-08-31 PROCEDURE — 85730 THROMBOPLASTIN TIME PARTIAL: CPT

## 2023-08-31 PROCEDURE — 33361 REPLACE AORTIC VALVE PERQ: CPT | Performed by: THORACIC SURGERY (CARDIOTHORACIC VASCULAR SURGERY)

## 2023-08-31 PROCEDURE — 2500000003 HC RX 250 WO HCPCS

## 2023-08-31 PROCEDURE — 85027 COMPLETE CBC AUTOMATED: CPT

## 2023-08-31 PROCEDURE — 2720000010 HC SURG SUPPLY STERILE

## 2023-08-31 PROCEDURE — 6360000002 HC RX W HCPCS: Performed by: STUDENT IN AN ORGANIZED HEALTH CARE EDUCATION/TRAINING PROGRAM

## 2023-08-31 PROCEDURE — 02RF38Z REPLACEMENT OF AORTIC VALVE WITH ZOOPLASTIC TISSUE, PERCUTANEOUS APPROACH: ICD-10-PCS | Performed by: THORACIC SURGERY (CARDIOTHORACIC VASCULAR SURGERY)

## 2023-08-31 PROCEDURE — 2140000000 HC CCU INTERMEDIATE R&B

## 2023-08-31 PROCEDURE — 93005 ELECTROCARDIOGRAM TRACING: CPT | Performed by: INTERNAL MEDICINE

## 2023-08-31 PROCEDURE — C1894 INTRO/SHEATH, NON-LASER: HCPCS

## 2023-08-31 PROCEDURE — 86850 RBC ANTIBODY SCREEN: CPT

## 2023-08-31 PROCEDURE — 85610 PROTHROMBIN TIME: CPT

## 2023-08-31 PROCEDURE — 2580000003 HC RX 258: Performed by: INTERNAL MEDICINE

## 2023-08-31 PROCEDURE — 6360000004 HC RX CONTRAST MEDICATION: Performed by: INTERNAL MEDICINE

## 2023-08-31 PROCEDURE — 86923 COMPATIBILITY TEST ELECTRIC: CPT

## 2023-08-31 PROCEDURE — 93005 ELECTROCARDIOGRAM TRACING: CPT | Performed by: STUDENT IN AN ORGANIZED HEALTH CARE EDUCATION/TRAINING PROGRAM

## 2023-08-31 RX ORDER — FENTANYL CITRATE 50 UG/ML
50 INJECTION, SOLUTION INTRAMUSCULAR; INTRAVENOUS
Status: DISCONTINUED | OUTPATIENT
Start: 2023-08-31 | End: 2023-09-01 | Stop reason: HOSPADM

## 2023-08-31 RX ORDER — MIDAZOLAM HYDROCHLORIDE 1 MG/ML
1 INJECTION INTRAMUSCULAR; INTRAVENOUS EVERY 6 HOURS PRN
Status: DISCONTINUED | OUTPATIENT
Start: 2023-08-31 | End: 2023-09-01 | Stop reason: HOSPADM

## 2023-08-31 RX ORDER — SODIUM CHLORIDE 9 MG/ML
INJECTION, SOLUTION INTRAVENOUS PRN
Status: DISCONTINUED | OUTPATIENT
Start: 2023-08-31 | End: 2023-08-31 | Stop reason: SDUPTHER

## 2023-08-31 RX ORDER — ASPIRIN 81 MG/1
81 TABLET ORAL DAILY
Status: DISCONTINUED | OUTPATIENT
Start: 2023-09-01 | End: 2023-09-01 | Stop reason: HOSPADM

## 2023-08-31 RX ORDER — ATROPINE SULFATE 0.4 MG/ML
0.5 INJECTION, SOLUTION INTRAVENOUS
Status: DISCONTINUED | OUTPATIENT
Start: 2023-08-31 | End: 2023-09-01 | Stop reason: HOSPADM

## 2023-08-31 RX ORDER — SODIUM CHLORIDE 0.9 % (FLUSH) 0.9 %
5-40 SYRINGE (ML) INJECTION EVERY 12 HOURS SCHEDULED
Status: DISCONTINUED | OUTPATIENT
Start: 2023-08-31 | End: 2023-09-01 | Stop reason: HOSPADM

## 2023-08-31 RX ORDER — SODIUM CHLORIDE 9 MG/ML
INJECTION, SOLUTION INTRAVENOUS PRN
Status: DISCONTINUED | OUTPATIENT
Start: 2023-08-31 | End: 2023-09-01 | Stop reason: HOSPADM

## 2023-08-31 RX ORDER — SODIUM CHLORIDE 9 MG/ML
INJECTION, SOLUTION INTRAVENOUS CONTINUOUS
Status: DISCONTINUED | OUTPATIENT
Start: 2023-08-31 | End: 2023-09-01

## 2023-08-31 RX ORDER — BISACODYL 5 MG/1
5 TABLET, DELAYED RELEASE ORAL DAILY PRN
Status: DISCONTINUED | OUTPATIENT
Start: 2023-08-31 | End: 2023-09-01 | Stop reason: HOSPADM

## 2023-08-31 RX ORDER — SODIUM CHLORIDE 0.9 % (FLUSH) 0.9 %
5-40 SYRINGE (ML) INJECTION EVERY 12 HOURS SCHEDULED
Status: DISCONTINUED | OUTPATIENT
Start: 2023-08-31 | End: 2023-09-01 | Stop reason: SDUPTHER

## 2023-08-31 RX ORDER — ONDANSETRON 2 MG/ML
4 INJECTION INTRAMUSCULAR; INTRAVENOUS EVERY 6 HOURS PRN
Status: DISCONTINUED | OUTPATIENT
Start: 2023-08-31 | End: 2023-09-01 | Stop reason: HOSPADM

## 2023-08-31 RX ORDER — SODIUM CHLORIDE 0.9 % (FLUSH) 0.9 %
5-40 SYRINGE (ML) INJECTION PRN
Status: DISCONTINUED | OUTPATIENT
Start: 2023-08-31 | End: 2023-09-01 | Stop reason: SDUPTHER

## 2023-08-31 RX ORDER — POLYETHYLENE GLYCOL 3350 17 G/17G
17 POWDER, FOR SOLUTION ORAL DAILY PRN
Status: DISCONTINUED | OUTPATIENT
Start: 2023-08-31 | End: 2023-09-01 | Stop reason: HOSPADM

## 2023-08-31 RX ORDER — HYDRALAZINE HYDROCHLORIDE 20 MG/ML
10 INJECTION INTRAMUSCULAR; INTRAVENOUS EVERY 10 MIN PRN
Status: DISCONTINUED | OUTPATIENT
Start: 2023-08-31 | End: 2023-09-01 | Stop reason: HOSPADM

## 2023-08-31 RX ORDER — ACETAMINOPHEN 325 MG/1
650 TABLET ORAL EVERY 4 HOURS PRN
Status: DISCONTINUED | OUTPATIENT
Start: 2023-08-31 | End: 2023-09-01 | Stop reason: HOSPADM

## 2023-08-31 RX ORDER — SODIUM CHLORIDE 9 MG/ML
INJECTION, SOLUTION INTRAVENOUS PRN
Status: COMPLETED | OUTPATIENT
Start: 2023-08-31 | End: 2023-09-01

## 2023-08-31 RX ORDER — CLOPIDOGREL BISULFATE 75 MG/1
75 TABLET ORAL DAILY
Status: DISCONTINUED | OUTPATIENT
Start: 2023-09-01 | End: 2023-09-01 | Stop reason: HOSPADM

## 2023-08-31 RX ORDER — SODIUM CHLORIDE 0.9 % (FLUSH) 0.9 %
5-40 SYRINGE (ML) INJECTION PRN
Status: DISCONTINUED | OUTPATIENT
Start: 2023-08-31 | End: 2023-09-01 | Stop reason: HOSPADM

## 2023-08-31 RX ADMIN — SODIUM CHLORIDE: 9 INJECTION, SOLUTION INTRAVENOUS at 13:13

## 2023-08-31 RX ADMIN — IOPAMIDOL 30 ML: 755 INJECTION, SOLUTION INTRAVENOUS at 12:35

## 2023-08-31 RX ADMIN — SODIUM CHLORIDE: 9 INJECTION, SOLUTION INTRAVENOUS at 09:09

## 2023-08-31 RX ADMIN — Medication 2000 MG: at 10:59

## 2023-08-31 RX ADMIN — SODIUM CHLORIDE: 9 INJECTION, SOLUTION INTRAVENOUS at 21:04

## 2023-08-31 ASSESSMENT — LIFESTYLE VARIABLES
HOW MANY STANDARD DRINKS CONTAINING ALCOHOL DO YOU HAVE ON A TYPICAL DAY: PATIENT DOES NOT DRINK
HOW OFTEN DO YOU HAVE A DRINK CONTAINING ALCOHOL: NEVER

## 2023-08-31 NOTE — BRIEF OP NOTE
Joseph  Sedation/Analgesia Post Sedation Record    Pt Name: Jomar Metzger  Account number: [de-identified]  MRN: [id number]  YOB: 1940  Procedure Performed By: Gilman Childes, MD MD Kelley Castleman, [facility]  Primary Care Physician: Merry Kearney MD  Date: 8/31/2023    POST-PROCEDURE    Physicians/Assistants: Gilman Childes, MD MD Kelley Castleman, RPVI    Procedure Performed:TAVR      Sedation/Anesthesia: Versed/ Fentanyl and 2% xylocaine local anesthesia. Estimated Blood Loss: < 50 ml. Specimens Removed: None         Disposition of Specimen: N/A        Complications: No Immediate Complications.        Post-procedure Diagnosis/Findings:       [id number] + 2cc          Gilman Childes, MD MD Kelley Castleman, RPVI  Electronically signed 8/31/2023 at 4:34 PM  Interventional Cardiology

## 2023-08-31 NOTE — PROGRESS NOTES
0900 Admitted into 2e05  ambulatory for TAVR. Nhi NPO, accompanied by wife and son. Vital signs obtained. Assessment and data collection intiated. Oriented to room. Policies and procedures for 2E explained. All questions answered with no further questions at this time. Fall precautions reviewed with patient.     0901 Allexyn sacrum pad put on coccyx, dated and marked with a P for preventative. Body shave done.    1244 Care plan reviewed with patient and  family. Patient and  family verbalize understanding of the plan of care and contribute to goal setting. 1108 to cath lab per bed, stable condition. 1145 report called to Rella Fears  on 3b, patient and family aware of transfer to 2b32 .     200 family aware of patients new room number, belongings will be brought to floor by family.

## 2023-08-31 NOTE — PROGRESS NOTES
Pt arrived from cath lab s/p tavr, vss , pt alert x 4, room orientation performed. Heart monitor applied and vitals taken. Explained to patient that [facility] is not responsible for any lost or stolen items. Patient verbalized understanding. Oriented to room and use of call light and bed controls. Patient denies pain or needs. No signs of distress noted. Bed locked & in low position, side-rails up x2. Call light in reach. Reminded patient to call nurse if any needs arise. Explained patients right to have family, representative or physician notified of their admission. Patient has Declined for physician to be notified. Patient has Declined for family/representative to be notified.

## 2023-08-31 NOTE — CARE COORDINATION
Case Management Assessment  Initial Evaluation    Date/Time of Evaluation: 8/31/2023 12:03 PM  Assessment Completed by: Glory Calderón RN    If patient is discharged prior to next notation, then this note serves as note for discharge by case management. Patient Name: Leonel Anderson                   YOB: 1940  Diagnosis: Severe aortic stenosis [I35.0]                   Date / Time: 8/31/2023  8:06 AM  Location: [address] [place] [address]     Patient Admission Status: Inpatient   Readmission Risk Low 0-14, Mod 15-19), High > 20: Readmission Risk Score: 13.2    Current PCP: Terrence Wang MD  PCP verified by CM? Yes    Chart Reviewed: Yes      History Provided by: Patient  Patient Orientation: Alert and Oriented    Patient Cognition: Alert    Hospitalization in the last 30 days (Readmission):  No    If yes, Readmission Assessment in  Navigator will be completed. Advance Directives:      Code Status: Full Code   Patient's Primary Decision Maker is: Patient Declined (Legal Next of Kin Remains as Decision Maker)      Discharge Planning:    Patient lives with: Spouse/Significant Other Type of Home: House  Primary Care Giver: Self  Patient Support Systems include: Spouse/Significant Other, Children, Friends/Neighbors, Family Members   Current Financial resources: Medicare, Other (Comment) ([employer])  Current community resources: None  Current services prior to admission: Durable Medical Equipment            Current DME: Rj Foster ([employer] - doesn't use)            Type of Home Care services:  None    ADLS  Prior functional level: Independent in ADLs/IADLs  Current functional level: Independent in ADLs/IADLs    Family can provide assistance at DC: Yes  Would you like Case Management to discuss the discharge plan with any other family members/significant others, and if so, who?  No  Plans to Return to Present Housing: Yes  Other Identified Issues/Barriers to RETURNING to current housing: none  Potential Assistance needed at discharge: N/A            Potential DME:    Patient expects to discharge to: [facility] for transportation at discharge: Family    Financial    Payor: Sandie Sax / Plan: MEDICARE PART A AND B / Product Type: *No Product type* /     Does insurance require precert for SNF: No    Potential assistance Purchasing Medications: No  Meds-to-Beds request:        [employer] [facility] [place] [address], [facility], [address] [phone] [place] [address]  [address] [place] [address], [address] [id number]  Phone: [phone] Fax: [phone]      Notes:    Factors facilitating achievement of predicted outcomes: Family support, Motivated, Cooperative, Pleasant, Sense of humor, and Good insight into deficits    Barriers to discharge: TAVR recovery    Additional Case Management Notes: Here for elective procedure, TAVR to be done today with Dr. Mar Garcia. ECHO to be done in the am.     Procedure:   8/31 TAVR procedure with Dr. Mar Garcia. 9/1 ECHO: to be done. The Plan for Transition of Care is related to the following treatment goals of Severe aortic stenosis [I35.0]    Patient Goals/Plan/Treatment Preferences: Spoke with pt, wife and son. Pt and wife live together, son is here visiting from [place]. Wife will be with pt for 7 days post procedure. Denies any discharge needs. They have a walker at home, but pt does not use. Transportation/Food Security/Housekeeping Addressed: No issues identified.      Emerald Barrera RN  Case Management Department

## 2023-08-31 NOTE — PLAN OF CARE
Problem: Cardiovascular - Adult  Goal: Maintains optimal cardiac output and hemodynamic stability  Outcome: Progressing  Flowsheets (Taken 8/31/2023 1537)  Maintains optimal cardiac output and hemodynamic stability:   Monitor blood pressure and heart rate   Monitor urine output and notify Licensed Independent Practitioner for values outside of normal range   Assess for signs of decreased cardiac output   Administer fluid and/or volume expanders as ordered   Administer vasoactive medications as ordered     Problem: Cardiovascular - Adult  Goal: Absence of cardiac dysrhythmias or at baseline  Outcome: Progressing  Flowsheets (Taken 8/31/2023 1537)  Absence of cardiac dysrhythmias or at baseline:   Monitor cardiac rate and rhythm   Administer antiarrhythmia medication and electrolyte replacement as ordered   Assess for signs of decreased cardiac output     Problem: Chronic Conditions and Co-morbidities  Goal: Patient's chronic conditions and co-morbidity symptoms are monitored and maintained or improved  Flowsheets (Taken 8/31/2023 0915 by Sam Berger RN)  Care Plan - Patient's Chronic Conditions and Co-Morbidity Symptoms are Monitored and Maintained or Improved:   Monitor and assess patient's chronic conditions and comorbid symptoms for stability, deterioration, or improvement   Collaborate with multidisciplinary team to address chronic and comorbid conditions and prevent exacerbation or deterioration   Update acute care plan with appropriate goals if chronic or comorbid symptoms are exacerbated and prevent overall improvement and discharge     Problem: Discharge Planning  Goal: Discharge to home or other facility with appropriate resources  Flowsheets  Taken 8/31/2023 1535 by Brayan Winters RN  Discharge to home or other facility with appropriate resources:   Identify barriers to discharge with patient and caregiver   Identify discharge learning needs (meds, wound care, etc)   Refer to discharge planning if patient

## 2023-09-01 VITALS
OXYGEN SATURATION: 96 % | SYSTOLIC BLOOD PRESSURE: 126 MMHG | DIASTOLIC BLOOD PRESSURE: 62 MMHG | HEART RATE: 64 BPM | BODY MASS INDEX: 23.25 KG/M2 | HEIGHT: 70 IN | TEMPERATURE: 97.9 F | RESPIRATION RATE: 18 BRPM | WEIGHT: 162.4 LBS

## 2023-09-01 LAB
ANION GAP SERPL CALC-SCNC: 10 MEQ/L (ref 8–16)
APTT PPP: 34.7 SECONDS (ref 22–38)
BUN SERPL-MCNC: 19 MG/DL (ref 7–22)
CALCIUM SERPL-MCNC: 8.1 MG/DL (ref 8.5–10.5)
CHLORIDE SERPL-SCNC: 106 MEQ/L (ref 98–111)
CO2 SERPL-SCNC: 22 MEQ/L (ref 23–33)
CREAT SERPL-MCNC: 1.2 MG/DL (ref 0.4–1.2)
DEPRECATED RDW RBC AUTO: 51.4 FL (ref 35–45)
ERYTHROCYTE [DISTWIDTH] IN BLOOD BY AUTOMATED COUNT: 15.3 % (ref 11.5–14.5)
GFR SERPL CREATININE-BSD FRML MDRD: 60 ML/MIN/1.73M2
GLUCOSE SERPL-MCNC: 87 MG/DL (ref 70–108)
HCT VFR BLD AUTO: 31.2 % (ref 42–52)
HGB BLD-MCNC: 9.7 GM/DL (ref 14–18)
INR PPP: 1.2 (ref 0.85–1.13)
LV EF: 53 %
LVEF MODALITY: NORMAL
MCH RBC QN AUTO: 28.8 PG (ref 26–33)
MCHC RBC AUTO-ENTMCNC: 31.1 GM/DL (ref 32.2–35.5)
MCV RBC AUTO: 92.6 FL (ref 80–94)
PLATELET # BLD AUTO: 185 THOU/MM3 (ref 130–400)
PMV BLD AUTO: 10.6 FL (ref 9.4–12.4)
POTASSIUM SERPL-SCNC: 4.2 MEQ/L (ref 3.5–5.2)
RBC # BLD AUTO: 3.37 MILL/MM3 (ref 4.7–6.1)
SODIUM SERPL-SCNC: 138 MEQ/L (ref 135–145)
WBC # BLD AUTO: 8.1 THOU/MM3 (ref 4.8–10.8)

## 2023-09-01 PROCEDURE — 85610 PROTHROMBIN TIME: CPT

## 2023-09-01 PROCEDURE — 80048 BASIC METABOLIC PNL TOTAL CA: CPT

## 2023-09-01 PROCEDURE — 85027 COMPLETE CBC AUTOMATED: CPT

## 2023-09-01 PROCEDURE — 85730 THROMBOPLASTIN TIME PARTIAL: CPT

## 2023-09-01 PROCEDURE — 2580000003 HC RX 258: Performed by: INTERNAL MEDICINE

## 2023-09-01 PROCEDURE — 93306 TTE W/DOPPLER COMPLETE: CPT

## 2023-09-01 PROCEDURE — 36415 COLL VENOUS BLD VENIPUNCTURE: CPT

## 2023-09-01 PROCEDURE — 6370000000 HC RX 637 (ALT 250 FOR IP): Performed by: INTERNAL MEDICINE

## 2023-09-01 PROCEDURE — 2580000003 HC RX 258: Performed by: STUDENT IN AN ORGANIZED HEALTH CARE EDUCATION/TRAINING PROGRAM

## 2023-09-01 RX ORDER — CLOPIDOGREL BISULFATE 75 MG/1
75 TABLET ORAL DAILY
Qty: 30 TABLET | Refills: 3 | Status: SHIPPED | OUTPATIENT
Start: 2023-09-02

## 2023-09-01 RX ADMIN — ASPIRIN 81 MG: 81 TABLET, COATED ORAL at 07:53

## 2023-09-01 RX ADMIN — SODIUM CHLORIDE, PRESERVATIVE FREE 10 ML: 5 INJECTION INTRAVENOUS at 07:53

## 2023-09-01 RX ADMIN — CLOPIDOGREL BISULFATE 75 MG: 75 TABLET ORAL at 07:53

## 2023-09-01 NOTE — DISCHARGE INSTRUCTIONS
DO NOT RESUME YOUR METOPROLOL (TOPROL) UNTIL SEEN IN CARDIOLOGY OFFICE ON 9/5/2023. You will be advised on whether to begin taking it again at that time. You will stop the brilinta and begin taking plavix (clopidogrel). You will resume your eliquis. Post Transcatheter Aortic Valve Replacement (TAVR) Discharge Instructions     Your follow-up appointment and echocardiogram will be scheduled by Dr. Yoselin Leija office and their office staff and will call you with the date and time. We are here for you! If you have any questions please call: Mervat Marquez BSN, RN and Kimberly Yepez BSN, RN  [phone]      Do you have the help you need at home? Someone must be with you for the first 5-7 days or until you are seen in the office post TAVR. ACTIVITY:  Weigh yourself every day in the morning after using the bathroom. NO DRIVING UNTIL YOU RETURN TO THE DOCTOR'S OFFICE. You may ride in a car. Do not lift more than five to ten pounds for the first week you are home.  (A gallon of milk is 7 lbs)  Get up and get dressed every day. Do not stay in bed. Set a daily routine. This is an important step in re-gaining your strength. You may walk up and down a few stairs. Walk as much as you can. This will help facilitate the healing process. Plan rest periods during the day. Avoid work that increases muscle tension.        (straining with bowel movements, moving furniture)    WOUND CARE:  Remove Band-Aids after 24 hours of placement. May shower once Band-Aids are removed. No bathtubs, pools, or hot tubs for the first week after your procedure   Cleanse wounds with Hibiclens soap and water. Pat incision dry. Keep wounds open to air after Band-Aids are removed and keep dry. A small amount of bloody or clear drainage is normal. Call if there is any extensive bruising, oozing, swelling or hardness of the site.   Watch for signs of infections: redness, incision hot to the touch, fever technician) that you have an implanted heart valve. Failure to do so may result in damage to the heart valve that could result in death. Before an MRI (magnetic resonance imaging) procedure, always notify the doctor (or medical technician) that you have an implanted heart valve. What symptoms or health problems do you need to look out for after you leave the hospital? Call your physician if you have any of these symptoms: (Phone Number: [phone])  Weight pound gain of 3 pounds in one day or 5 pounds in one week. Weight gain is NOT normal.    Swelling of the legs, ankles or feet  Increasing shortness of breath  Change in the color or temperature of your lower legs and feet  Develop abdominal pain or unrelieved nausea and/or vomiting  Develop any redness, incision, or limited movement of your arms or legs  Signs of infection: redness, incision hot to the touch, fevers greater than 101 degrees, or colored drainage from your incision  Seroma is an accumulation of fluid in the tissues at the groin surgical site. Symptoms may include: a lump near the groin surgical site, clear fluid draining from the site, redness, warmth, or swelling.     Nursing Home Requests:  Patient will need someone to round on them every hour for 24 hours to assess groin sites  Patient may require increased assistance for the first week  If there are any questions about facilitation of needs post TAVR please call: [phone]

## 2023-09-01 NOTE — OP NOTE
DATE: 09/01/23    PATIENT: Cecelia Harvey    MRN: [id number]     Acct: [de-identified]    PREOP DIAGNOSIS:   Severe aortic stenosis    Postop diagnosis:  Severe aortic stenosis    Procedure:  Transcatheter aortic valve replacement with an [place] 26 mm S3 prosthesis    OPERATORS:  Dwayne Reis MD; Antwan Diego MD    ESTIMATED BLOOD LOSS: 50 ml    A pre-procedure discussion was conducted with the patient to confirm/exclude candidacy for open surgical salvage in case of procedure failure. After informed consent was obtained from the patient, the patient was brought to the hybrid operating room and prepped in sterile fashion. Infiltration of the bilateral inguinal regions was accomplished with 2% lidocaine. Using micropuncture and modified Seldinger technique, a 6 Fr sheath was inserted into the right internal jugular vein. A 5 Fr pacemaker was inserted into position under fluoroscopic guidance into the right ventricle, with verification of appropriate pacing thresholds. Using the same technique under fluoroscopic guidance, a 5 Fr sheath was inserted into the left common femoral artery. Similarly, a 4 Fr, followed by a 6 Fr was inserted into the right common femoral artery. This was replaced with a 6 Fr J4 catheter over a guidewire, and a Supra Core wire was used to traverse the aortic arch. We then performed standard preclose technique by deploying a Perclose and leaving it incomplete. Using sequential dilations, the 6 Fr was upsized to a 14 Fr sheath. The patient was heparinized to an ACT above 250 seconds. A straight pigtail was placed via the left femoral artery and aortography was performed in a coplanar view to ensure alignment. The aortic valve was crossed using a straight wire through an AL1 catheter. The guidewire was changed to a J-tipped wire, on which was inserted an angled pigtail. Direct hemodynamic measurements were obtained. The valve was then checked for appropriate loading on fluoroscopy.

## 2023-09-01 NOTE — PROGRESS NOTES
IV removed x2, telemetry removed and left in room for cleaning, discharge instructions reviewed with patient and signed, patient awaiting one medication from outpatient pharmacy, family here to transport patient home.

## 2023-09-01 NOTE — TELEPHONE ENCOUNTER
Orders received from Dr. Mcdowell Number to schedule the patient for her 1 year post TAVR follow up appointment with an ECHO, CBC, and BMP. Appointment is scheduled with Edith for 9/3/24 at 1030. ECHO scheduled for 8/30/24 at ____.

## 2023-09-01 NOTE — PROCEDURES
[place] [id number]                            CARDIAC CATHETERIZATION    PATIENT NAME: Alex Toro                     :        1940  MED REC NO:   [id number]                           ROOM:       0028  ACCOUNT NO:   [de-identified]                           ADMIT DATE: 2023  PROVIDER:     Alavrado Coates MD    DATE OF PROCEDURE:  2023    SURGEON:  Alvarado Coates MD    INDICATION:  Severe symptomatic aortic stenosis, not a candidate for  open heart surgery. NYHA class III CHF. DESCRIPTION OF PROCEDURE:  After informed consent was obtained from the  patient, he was brought to the cardiac catheterization laboratory and  prepped in sterile fashion. Right internal jugular vein and bilateral  femoral artery access was chosen as the primary point of access. Pre-procedure time-out was completed. After infiltration of the right  neck with 2% lidocaine using micropuncture, modified Seldinger technique  under fluoroscopic guidance and ultrasound guidance, I was able to  insert a 6-Romanian locking sheath in the right internal jugular vein. I  floated the 5-Romanian balloon-tipped pacemaker into the right ventricular  apex. Pacing thresholds were checked. Pacemaker capture was lasted  less than 1 mA output pacing. I then turned my attention to the left  femoral artery access. After infiltration of the left inguinal region  with 2% lidocaine using micropuncture and modified Seldinger technique  under fluoroscopic guidance and ultrasound guidance, I was able to  insert a 5-Romanian long sheath into the left femoral artery. Due to the  tortuosity of the iliacs, I had exchanged that out over a 0.035 Supra  Core wire for a 6-Romanian x 45 cm sheath. I turned my attention to the  right femoral artery.   After infiltration of the right inguinal region  with 2% lidocaine using micropuncture and modified Seldinger Given these findings, no further  intervention was undertaken. IV protamine was given. I removed all  equipments from the right femoral artery, completing both Perclose  sutures and used 6-Urdu Angio-Seal for hemostasis. On the left side,  I used 6-Urdu Angio-Seal for hemostasis. ECG was done demonstrating no significant conduction abnormality. Therefore, we removed the pacemaker and held manual pressure. IMMEDIATE COMPLICATIONS:  None. MEDICATIONS:  See EMR. ACCESS:  See above. ESTIMATED BLOOD LOSS:  Less than 50 mL. SUMMARY:  Successful percutaneous transfemoral transcatheter aortic  valve replacement with a Janeth 3 Ultra 26 (plus 2 mL). PLAN:  1. Bedrest.  2.  Optimal medical therapy. 3.  Risk factor modification. 4.  Routine access site care. 5.  DAPT. 6.  Restart home meds. 7. Up-titrate guideline-directed therapy for CHF. 8.  Overnight inpatient admission. 9.  Restart home meds. 10.  Avoid AV sulma blockers. 11.  Transthoracic echocardiogram in the a.m.  12.  Cardiac rehab. 13.  Follow up with [facility] per protocol. All the above was explained to the patient and the patient's family. They were agreeable and amenable to the above-mentioned plan.         Keron Vega MD    D: 09/01/2023 9:05:50       T: 09/01/2023 10:23:29     [email or web address]  Job#: [id number]     Doc#: [id number]    CC:

## 2023-09-01 NOTE — PROGRESS NOTES
Inpatient Cardiac Rehabilitation Consult    Received consult for Phase II Cardiac Rehabilitation. Patient needs cardiac rehab due to TAVR on 8/31/23. Importance of Cardiac Rehab discussed with patient. Reviewed cardiac rehab class times. Patient questions answered. Will send referral to [facility] per patient request.  Cardiac Rehab brochure given.

## 2023-09-01 NOTE — PLAN OF CARE
Problem: Chronic Conditions and Co-morbidities  Goal: Patient's chronic conditions and co-morbidity symptoms are monitored and maintained or improved  9/1/2023 0050 by Tim Buck RN  Outcome: Progressing  Flowsheets (Taken 8/31/2023 0915 by Frank Allison, RN)  Care Plan - Patient's Chronic Conditions and Co-Morbidity Symptoms are Monitored and Maintained or Improved:   Monitor and assess patient's chronic conditions and comorbid symptoms for stability, deterioration, or improvement   Collaborate with multidisciplinary team to address chronic and comorbid conditions and prevent exacerbation or deterioration   Update acute care plan with appropriate goals if chronic or comorbid symptoms are exacerbated and prevent overall improvement and discharge     Problem: Discharge Planning  Goal: Discharge to home or other facility with appropriate resources  9/1/2023 0050 by Tim Buck RN  Outcome: Progressing  Flowsheets (Taken 8/31/2023 1535 by Ebony Uriarte RN)  Discharge to home or other facility with appropriate resources:   Identify barriers to discharge with patient and caregiver   Identify discharge learning needs (meds, wound care, etc)   Refer to discharge planning if patient needs post-hospital services based on physician order or complex needs related to functional status, cognitive ability or social support system   Arrange for interpreters to assist at discharge as needed     Problem: ABCDS Injury Assessment  Goal: Absence of physical injury  Outcome: Progressing  Flowsheets (Taken 9/1/2023 0050)  Absence of Physical Injury: Implement safety measures based on patient assessment     Problem: Safety - Adult  Goal: Free from fall injury  Outcome: Progressing  Flowsheets (Taken 9/1/2023 0050)  Free From Fall Injury:   Instruct family/caregiver on patient safety   Based on caregiver fall risk screen, instruct family/caregiver to ask for assistance with transferring infant if caregiver noted to have fall risk factors     Problem: Cardiovascular - Adult  Goal: Maintains optimal cardiac output and hemodynamic stability  9/1/2023 0050 by Charli Sweeney RN  Outcome: Progressing  Flowsheets (Taken 8/31/2023 1537 by Kwasi Dye, RN)  Maintains optimal cardiac output and hemodynamic stability:   Monitor blood pressure and heart rate   Monitor urine output and notify Licensed Independent Practitioner for values outside of normal range   Assess for signs of decreased cardiac output   Administer fluid and/or volume expanders as ordered   Administer vasoactive medications as ordered     Problem: Cardiovascular - Adult  Goal: Absence of cardiac dysrhythmias or at baseline  9/1/2023 0050 by Charli Sweenye RN  Outcome: Progressing  [address] (Taken 8/31/2023 1537 by Kwasi Dye, RN)  Absence of cardiac dysrhythmias or at baseline:   Monitor cardiac rate and rhythm   Administer antiarrhythmia medication and electrolyte replacement as ordered   Assess for signs of decreased cardiac output       Care plan reviewed with patient. Patient verbalizes understanding of the care plan and contributed to goal setting.

## 2023-09-01 NOTE — PROGRESS NOTES
Patient ambulated entire length of the floor. No issues noted. Patient stated that the walk [facility] him feel better. \"

## 2023-09-05 ENCOUNTER — TELEPHONE (OUTPATIENT)
Dept: CARDIOLOGY CLINIC | Age: 83
End: 2023-09-05

## 2023-09-05 ENCOUNTER — OFFICE VISIT (OUTPATIENT)
Dept: CARDIOTHORACIC SURGERY | Age: 83
End: 2023-09-05
Payer: MEDICARE

## 2023-09-05 VITALS
DIASTOLIC BLOOD PRESSURE: 69 MMHG | BODY MASS INDEX: 24.41 KG/M2 | HEIGHT: 69 IN | OXYGEN SATURATION: 99 % | SYSTOLIC BLOOD PRESSURE: 132 MMHG | WEIGHT: 164.8 LBS | HEART RATE: 67 BPM

## 2023-09-05 DIAGNOSIS — Z95.2 S/P TAVR (TRANSCATHETER AORTIC VALVE REPLACEMENT): Primary | ICD-10-CM

## 2023-09-05 DIAGNOSIS — I35.0 SEVERE AORTIC VALVE STENOSIS: Primary | ICD-10-CM

## 2023-09-05 PROCEDURE — 1124F ACP DISCUSS-NO DSCNMKR DOCD: CPT | Performed by: PHYSICIAN ASSISTANT

## 2023-09-05 PROCEDURE — 1036F TOBACCO NON-USER: CPT | Performed by: PHYSICIAN ASSISTANT

## 2023-09-05 PROCEDURE — G8420 CALC BMI NORM PARAMETERS: HCPCS | Performed by: PHYSICIAN ASSISTANT

## 2023-09-05 PROCEDURE — 1111F DSCHRG MED/CURRENT MED MERGE: CPT | Performed by: PHYSICIAN ASSISTANT

## 2023-09-05 PROCEDURE — 99214 OFFICE O/P EST MOD 30 MIN: CPT | Performed by: PHYSICIAN ASSISTANT

## 2023-09-05 PROCEDURE — G8427 DOCREV CUR MEDS BY ELIG CLIN: HCPCS | Performed by: PHYSICIAN ASSISTANT

## 2023-09-05 NOTE — TELEPHONE ENCOUNTER
Orders received from Dr. Retia Nyhan to schedule the patient for her 1 year post TAVR follow up appointment with an ECHO, CBC, and BMP. Appointment is scheduled with Edith for 9/3/23 at 1030. ECHO scheduled for 8/30/24 at 10:15.    1 year follow ups will be given to Yasmany at his 30 day visit.

## 2023-09-05 NOTE — PROGRESS NOTES
Physician Progress Note      PATIENT:               Crescencio Rollins  CSN #:                  [id number]  :                       1940  ADMIT DATE:       2023 8:06 AM  DISCH DATE:        2023 3:08 PM  RESPONDING  PROVIDER #:        Vini Allen MD          QUERY TEXT:    Pt admitted with Severe aortic stenosis and has CHF (HFrEF) documented. If   possible, please document in progress notes and discharge summary further   specificity regarding the type and acuity of CHF:    The medical record reflects the following:  Risk Factors: HFrEF  Clinical Indicators:  BNP 13,547, resp 16-20/min. LS unlabored with clear   diminished LS. No edema noted  Treatment: Hydralazine    Thank you. Gena Crooks RN, Clinical Documentation Integrity, [employer], [facility]), CRCR  Options provided:  -- Acute on Chronic Systolic CHF/HFrEF  -- Acute Systolic CHF/HFrEF  -- Chronic Systolic CHF/HFrEF  -- Other - I will add my own diagnosis  -- Disagree - Not applicable / Not valid  -- Disagree - Clinically unable to determine / Unknown  -- Refer to Clinical Documentation Reviewer    PROVIDER RESPONSE TEXT:    This patient has chronic systolic CHF/HFrEF.     Query created by: Mercedes Olivarez on 2023 10:03 AM      Electronically signed by:  Vini Allen MD 2023 8:25 PM

## 2023-09-05 NOTE — PATIENT INSTRUCTIONS
You may receive a survey regarding the care you received during your visit. Your input is valuable to us. We encourage you to complete and return your survey. We hope you will choose us in the future for your healthcare needs. Thank you for choosing [facility]!   Your Medical Assistant Today:  Alexia MASON   Your Provider for Today: Roxy Winters PA-C  Ph. [phone] opt 1

## 2023-09-05 NOTE — PROGRESS NOTES
Structural Heart/Cardiology Follow up    9/5/2023 12:58 PM    Patient's Name/Date of Birth: Lora Loyola / 1940 (81 y.o.)    PCP: Dina Desai MD    Date: September 5, 2023     CC:   Chief Complaint   Patient presents with    Follow-up     TAVR with Dr. Moiz Davis  8.31.2023     HPI  We had the pleasure of seeing Lora Loyola in the office today, as you know this is a very pleasant 81y.o. year old male with a history of aortic stenosis who underwent a successful TAVR on 8/31/23. The pt denies chest pressure, palpitations, SOB, fever, chills, N/V/D. PastMedical History:  Rosales Andrew  has a past medical history of CAD (coronary artery disease), Chronic kidney disease, and Hypertension. Past Surgical History:  The patient  has a past surgical history that includes joint replacement (Right, 2010) and Abdomen surgery. Allergies: The patient has No Known Allergies. Medications:    Current Outpatient Medications:     clopidogrel (PLAVIX) 75 MG tablet, Take 1 tablet by mouth daily, Disp: 30 tablet, Rfl: 3    atorvastatin (LIPITOR) 80 MG tablet, Take 1 tablet by mouth nightly, Disp: 30 tablet, Rfl: 3    amiodarone (CORDARONE) 200 MG tablet, Take 1 tablet by mouth daily, Disp: 30 tablet, Rfl: 2    apixaban (ELIQUIS) 5 MG TABS tablet, Take 1 tablet by mouth 2 times daily, Disp: 60 tablet, Rfl: 1    empagliflozin (JARDIANCE) 10 MG tablet, Take 1 tablet by mouth daily, Disp: 30 tablet, Rfl: 3    losartan (COZAAR) 100 MG tablet, Take 1 tablet by mouth daily Indications: resume on 9/2/23, Disp: , Rfl:     metoprolol succinate (TOPROL XL) 25 MG extended release tablet, Take 1 tablet by mouth daily (Patient not taking: Reported on 9/5/2023), Disp: 30 tablet, Rfl: 3    Family History: This patient's family history is not on file. Social History:  Rosales Andrew  reports that he has never smoked. He has never used smokeless tobacco. He reports that he does not drink alcohol and does not use drugs.     ROS:

## 2023-09-13 ASSESSMENT — ENCOUNTER SYMPTOMS
COUGH: 0
ABDOMINAL DISTENTION: 0

## 2023-09-13 NOTE — PROGRESS NOTES
Heart Failure Clinic       Visit Date: 9/14/2023  Cardiologist:  Dr. Med Quiles  Primary Care Physician: Dr. Merry Kearney MD  Referred by:     Jomar Metzger is a 81 y.o. male who presents today for:  Chief Complaint   Patient presents with    Congestive Heart Failure       HPI:   Jomar Meztger is a 81 y.o. male who presents to the office for a follow up patient visit in the heart failure clinic. Accompanied by wife, Monique Batista    TYPE HF: HFrEF 25% >> 50-55% (post TAVR)  Cause: ICM/Valvular  Valves:  Severe AS s/p TAVR (8/31/23)  Device:  no  HX: Afib (Eliquis), CAD s/p PCI (LAD     Dry Wt:  169    Hospitalization:  7/2023 - new Afib,  new AS,     8/2023 -  169#  Been home about 10 days = still SOB and fatigued, but \"not as bad\"  Denies CP, dizziness. No appetite still. Down 13# in past 10 days - swelling \"75%\" improved. Walked from entrance, pushing wife in wheelchair - overall very active. Helps at [employer]. TODAY 9/2023 - F/U TAVR  167#  Home readings reviewed - -140s - HR 56-66  Doing great - feeling 90% improved - energy and breathing better  No dizziness or palpitations. Past Medical History:   Diagnosis Date    CAD (coronary artery disease)     Chronic kidney disease     Hypertension      Past Surgical History:   Procedure Laterality Date    ABDOMEN SURGERY      2 hernia OR one 1965, 1993    JOINT REPLACEMENT Right 2010    hip replacement     History reviewed. No pertinent family history.   Social History     Tobacco Use    Smoking status: Never    Smokeless tobacco: Never   Substance Use Topics    Alcohol use: Never     Current Outpatient Medications   Medication Sig Dispense Refill    metoprolol succinate (TOPROL XL) 25 MG extended release tablet Take 1 tablet by mouth daily 30 tablet 3    clopidogrel (PLAVIX) 75 MG tablet Take 1 tablet by mouth daily 30 tablet 3    atorvastatin (LIPITOR) 80 MG tablet Take 1 tablet by mouth nightly 30 tablet 3    apixaban (ELIQUIS) 5 MG

## 2023-09-14 ENCOUNTER — OFFICE VISIT (OUTPATIENT)
Dept: CARDIOLOGY CLINIC | Age: 83
End: 2023-09-14
Payer: MEDICARE

## 2023-09-14 VITALS
DIASTOLIC BLOOD PRESSURE: 54 MMHG | HEIGHT: 70 IN | OXYGEN SATURATION: 98 % | HEART RATE: 59 BPM | BODY MASS INDEX: 23.91 KG/M2 | SYSTOLIC BLOOD PRESSURE: 110 MMHG | WEIGHT: 167 LBS

## 2023-09-14 DIAGNOSIS — I48.0 PAROXYSMAL ATRIAL FIBRILLATION (HCC): ICD-10-CM

## 2023-09-14 DIAGNOSIS — Z95.2 S/P TAVR (TRANSCATHETER AORTIC VALVE REPLACEMENT): Primary | ICD-10-CM

## 2023-09-14 DIAGNOSIS — I50.32 CHF (CONGESTIVE HEART FAILURE), NYHA CLASS I, CHRONIC, DIASTOLIC (HCC): ICD-10-CM

## 2023-09-14 PROCEDURE — 1124F ACP DISCUSS-NO DSCNMKR DOCD: CPT | Performed by: NURSE PRACTITIONER

## 2023-09-14 PROCEDURE — 1111F DSCHRG MED/CURRENT MED MERGE: CPT | Performed by: NURSE PRACTITIONER

## 2023-09-14 PROCEDURE — G8427 DOCREV CUR MEDS BY ELIG CLIN: HCPCS | Performed by: NURSE PRACTITIONER

## 2023-09-14 PROCEDURE — G8420 CALC BMI NORM PARAMETERS: HCPCS | Performed by: NURSE PRACTITIONER

## 2023-09-14 PROCEDURE — 1036F TOBACCO NON-USER: CPT | Performed by: NURSE PRACTITIONER

## 2023-09-14 PROCEDURE — 99214 OFFICE O/P EST MOD 30 MIN: CPT | Performed by: NURSE PRACTITIONER

## 2023-09-14 RX ORDER — ASPIRIN 81 MG/1
81 TABLET ORAL DAILY
COMMUNITY
End: 2023-09-14 | Stop reason: ALTCHOICE

## 2023-09-14 RX ORDER — METOPROLOL SUCCINATE 25 MG/1
25 TABLET, EXTENDED RELEASE ORAL DAILY
Qty: 30 TABLET | Refills: 3 | Status: SHIPPED | OUTPATIENT
Start: 2023-09-14

## 2023-09-14 ASSESSMENT — ENCOUNTER SYMPTOMS: SHORTNESS OF BREATH: 0

## 2023-09-14 NOTE — PATIENT INSTRUCTIONS
You may receive a survey regarding the care you received during your visit. Your input is valuable to us. We encourage you to complete and return your survey. We hope you will choose us in the future for your healthcare needs. Your nurses today were Keri and [employer].   Office hours:   Mon-Thurs 8-4:30  Friday 8-12  Phone: [phone]    Continue:  Continue current medications  Daily weights and record  Fluid restriction of 2 Liters per day  Limit sodium in diet to around 9796-4007 mg/day  Monitor BP  Activity as tolerated     Call the [address] for any of the following symptoms:   Weight gain of 2-3 pounds in 1 day or 5 pounds in 1 week  Increased shortness of breath  Shortness of breath while laying down  Cough  Chest pain  Swelling in feet, ankles or legs  Bloating in abdomen  Fatigue          Get blood work before or after ECHO on 9/25  -  order is at lab

## 2023-09-25 ENCOUNTER — HOSPITAL ENCOUNTER (OUTPATIENT)
Dept: NON INVASIVE DIAGNOSTICS | Age: 83
Discharge: HOME | End: 2023-09-25
Attending: INTERNAL MEDICINE
Payer: MEDICARE

## 2023-09-25 ENCOUNTER — HOSPITAL ENCOUNTER (OUTPATIENT)
Age: 83
Discharge: HOME | End: 2023-09-25
Attending: INTERNAL MEDICINE
Payer: MEDICARE

## 2023-09-25 DIAGNOSIS — I35.0 SEVERE AORTIC VALVE STENOSIS: ICD-10-CM

## 2023-09-25 LAB
ANION GAP SERPL CALC-SCNC: 10 MEQ/L (ref 8–16)
BUN SERPL-MCNC: 17 MG/DL (ref 7–22)
CALCIUM SERPL-MCNC: 8.7 MG/DL (ref 8.5–10.5)
CHLORIDE SERPL-SCNC: 106 MEQ/L (ref 98–111)
CO2 SERPL-SCNC: 26 MEQ/L (ref 23–33)
CREAT SERPL-MCNC: 1.3 MG/DL (ref 0.4–1.2)
DEPRECATED RDW RBC AUTO: 57.3 FL (ref 35–45)
ERYTHROCYTE [DISTWIDTH] IN BLOOD BY AUTOMATED COUNT: 16.5 % (ref 11.5–14.5)
GFR SERPL CREATININE-BSD FRML MDRD: 55 ML/MIN/1.73M2
GLUCOSE SERPL-MCNC: 107 MG/DL (ref 70–108)
HCT VFR BLD AUTO: 36.6 % (ref 42–52)
HGB BLD-MCNC: 10.9 GM/DL (ref 14–18)
MCH RBC QN AUTO: 28.3 PG (ref 26–33)
MCHC RBC AUTO-ENTMCNC: 29.8 GM/DL (ref 32.2–35.5)
MCV RBC AUTO: 95.1 FL (ref 80–94)
PLATELET # BLD AUTO: 199 THOU/MM3 (ref 130–400)
PMV BLD AUTO: 10.4 FL (ref 9.4–12.4)
POTASSIUM SERPL-SCNC: 4.4 MEQ/L (ref 3.5–5.2)
RBC # BLD AUTO: 3.85 MILL/MM3 (ref 4.7–6.1)
SODIUM SERPL-SCNC: 142 MEQ/L (ref 135–145)
WBC # BLD AUTO: 7.1 THOU/MM3 (ref 4.8–10.8)

## 2023-09-25 PROCEDURE — 36415 COLL VENOUS BLD VENIPUNCTURE: CPT

## 2023-09-25 PROCEDURE — 93306 TTE W/DOPPLER COMPLETE: CPT

## 2023-09-25 PROCEDURE — 80048 BASIC METABOLIC PNL TOTAL CA: CPT

## 2023-09-25 PROCEDURE — 85027 COMPLETE CBC AUTOMATED: CPT

## 2023-09-28 ENCOUNTER — TELEPHONE (OUTPATIENT)
Dept: CARDIOLOGY CLINIC | Age: 83
End: 2023-09-28

## 2023-09-28 ENCOUNTER — OFFICE VISIT (OUTPATIENT)
Dept: CARDIOLOGY CLINIC | Age: 83
End: 2023-09-28

## 2023-09-28 VITALS
WEIGHT: 163.4 LBS | SYSTOLIC BLOOD PRESSURE: 118 MMHG | BODY MASS INDEX: 23.39 KG/M2 | HEART RATE: 54 BPM | HEIGHT: 70 IN | DIASTOLIC BLOOD PRESSURE: 70 MMHG

## 2023-09-28 DIAGNOSIS — Z95.2 S/P TAVR (TRANSCATHETER AORTIC VALVE REPLACEMENT): Primary | ICD-10-CM

## 2023-09-28 NOTE — PROGRESS NOTES
Post tvar. Reports feeling excellent. No chest pain, sob noted.
Follows CHF. Continue GDMT. Discussed symptoms needing emergency care or those which require further medical attention. Discussed diet/exercise/BP/weight loss/health lifestyle choices/lipids; the patient understands the goals and will try to comply.       Disposition:  1 year           Electronically signed by Essie Haley MD   9/28/2023 at 11:15 AM EDT

## 2023-09-29 RX ORDER — CLOPIDOGREL BISULFATE 75 MG/1
75 TABLET ORAL DAILY
Qty: 30 TABLET | Refills: 3 | Status: CANCELLED | OUTPATIENT
Start: 2023-09-29

## 2023-10-16 ENCOUNTER — OFFICE VISIT (OUTPATIENT)
Dept: CARDIOLOGY CLINIC | Age: 83
End: 2023-10-16
Payer: MEDICARE

## 2023-10-16 VITALS
SYSTOLIC BLOOD PRESSURE: 110 MMHG | DIASTOLIC BLOOD PRESSURE: 62 MMHG | WEIGHT: 162 LBS | HEIGHT: 70 IN | OXYGEN SATURATION: 98 % | BODY MASS INDEX: 23.19 KG/M2 | HEART RATE: 55 BPM

## 2023-10-16 DIAGNOSIS — I48.0 PAROXYSMAL ATRIAL FIBRILLATION (HCC): ICD-10-CM

## 2023-10-16 DIAGNOSIS — I25.10 CORONARY ARTERY DISEASE INVOLVING NATIVE HEART WITHOUT ANGINA PECTORIS, UNSPECIFIED VESSEL OR LESION TYPE: ICD-10-CM

## 2023-10-16 DIAGNOSIS — Z95.2 S/P TAVR (TRANSCATHETER AORTIC VALVE REPLACEMENT): Primary | ICD-10-CM

## 2023-10-16 DIAGNOSIS — I50.32 CHF (CONGESTIVE HEART FAILURE), NYHA CLASS I, CHRONIC, DIASTOLIC (HCC): ICD-10-CM

## 2023-10-16 PROCEDURE — 1124F ACP DISCUSS-NO DSCNMKR DOCD: CPT | Performed by: NURSE PRACTITIONER

## 2023-10-16 PROCEDURE — G8420 CALC BMI NORM PARAMETERS: HCPCS | Performed by: NURSE PRACTITIONER

## 2023-10-16 PROCEDURE — G8484 FLU IMMUNIZE NO ADMIN: HCPCS | Performed by: NURSE PRACTITIONER

## 2023-10-16 PROCEDURE — 1036F TOBACCO NON-USER: CPT | Performed by: NURSE PRACTITIONER

## 2023-10-16 PROCEDURE — G8427 DOCREV CUR MEDS BY ELIG CLIN: HCPCS | Performed by: NURSE PRACTITIONER

## 2023-10-16 PROCEDURE — 99214 OFFICE O/P EST MOD 30 MIN: CPT | Performed by: NURSE PRACTITIONER

## 2023-10-16 ASSESSMENT — ENCOUNTER SYMPTOMS
SHORTNESS OF BREATH: 0
ABDOMINAL DISTENTION: 0
COUGH: 0

## 2023-10-16 NOTE — PATIENT INSTRUCTIONS
You may receive a survey regarding the care you received during your visit. Your input is valuable to us. We encourage you to complete and return your survey. We hope you will choose us in the future for your healthcare needs. Your nurses today were Christa Gaffney and [employer].   Office hours:   Mon-Thurs 8-4:30  Friday 8-12  Phone: [phone]    Continue:  Continue current medications  Daily weights and record  Fluid restriction of 2 Liters per day  Limit sodium in diet to around 3775-5129 mg/day  Monitor BP  Activity as tolerated     Call the [address] for any of the following symptoms:   Weight gain of 2-3 pounds in 1 day or 5 pounds in 1 week  Increased shortness of breath  Shortness of breath while laying down  Cough  Chest pain  Swelling in feet, ankles or legs  Bloating in abdomen  Fatigue

## 2023-10-16 NOTE — PROGRESS NOTES
Last ECHO - 30 day f/u TAVR 9/2023   Severe AS s/p TAVR (8/31/23)  CAD s/p PCI LAD (7/2023)  PAF - stable, heart tones regular, continue Eliquis. Mod MR - surveillance ECHO    Stable, appears Euvolemic  Lab reviewed - stable  Repeat blood work in [employer]  BP/HR stable   Continue diet/fluid adherence  Continue daily wts. F/U w/ Cardiology  F/U in clinic in 6 mth     Tolerating above noted HF meds, no ill side effects noted. Will continue to monitor kidney function and electrolytes. Will optimize as tolerated. Pt is compliant w/ medications. Total visit time of 30 minutes has been spent with patient on education of symptoms, management, medication, and plan of care; as well as review of chart: labs, ECHO, radiology reports, etc.   I personally spent more then 50% of the appt time face to face with the patient. Daily weights  Fluid restriction of 2 Liters per day  Limit sodium in diet to around 6308-1972 mg/day  Monitor BP  Activity as tolerated     Patient was instructed to call the [address] for any changes in symptoms as noted in AVS.      No follow-ups on file. or sooner if needed     Patient given educational materials - see patient instructions. We discussed the importance of weighing oneself and recording daily. We also discussed the importance of a low sodium diet, higher sodium foods to avoid and better low sodium food options. Patient verbalizes understanding of plan of care using teach back method, and is agreeable to the treatment plan.        Electronically signed by LEIF Khalil CNP on 10/16/2023 at 1:38 PM

## 2023-11-22 ENCOUNTER — TELEPHONE (OUTPATIENT)
Dept: CARDIOLOGY CLINIC | Age: 83
End: 2023-11-22

## 2023-11-22 NOTE — TELEPHONE ENCOUNTER
Patient called to inform that he has a sore on his toe that has not healed. He is seeing a podiatrist on Friday to examine his toe. He's also notified his PCP but he just wanted to let our office know as well since he has had recent procedure with Dr Kamla Yin.

## 2023-11-28 RX ORDER — ATORVASTATIN CALCIUM 80 MG/1
80 TABLET, FILM COATED ORAL NIGHTLY
Qty: 90 TABLET | Refills: 3 | Status: SHIPPED | OUTPATIENT
Start: 2023-11-28

## 2023-11-29 ENCOUNTER — TELEPHONE (OUTPATIENT)
Dept: CARDIOLOGY CLINIC | Age: 83
End: 2023-11-29

## 2023-11-29 NOTE — TELEPHONE ENCOUNTER
This patient has completed 3 months of DAPT post TAVR (8/31). According to the guidelines Plavix is only recommended for 3 months post implant. After reviewing the chart patient received 2 drug-eluting stents to the LAD on 7/24/23. Dr Malcolm Leo how would you like to proceed?

## 2023-12-27 RX ORDER — CLOPIDOGREL BISULFATE 75 MG/1
75 TABLET ORAL DAILY
Qty: 90 TABLET | Refills: 2 | Status: SHIPPED | OUTPATIENT
Start: 2023-12-27

## 2024-02-07 ENCOUNTER — TELEPHONE (OUTPATIENT)
Dept: PHARMACY | Age: 84
End: 2024-02-07

## 2024-02-07 NOTE — TELEPHONE ENCOUNTER
Reached out to patient and informed that effective 2/7/24 he is approved for Eliquis patient assistance program. Program approval is active until 12/31/24. Patient voiced understanding.  Annemarie Law [facility] - Prescription Assistance ([phone]) 2/7/2024,3:00 PM

## 2024-03-01 LAB
BILIRUBIN, URINE: NEGATIVE
BLOOD, URINE: NEGATIVE
BUN BLDV-MCNC: 25 MG/DL
CALCIUM SERPL-MCNC: 8.9 MG/DL
CHLORIDE BLD-SCNC: 106 MMOL/L
CLARITY: CLEAR
CO2: 26 MMOL/L
COLOR: YELLOW
CREAT SERPL-MCNC: 1.1 MG/DL
CREATININE, URINE: NORMAL
EGFR: 62
GLUCOSE BLD-MCNC: 106 MG/DL
GLUCOSE URINE: >=1000
KETONES, URINE: NEGATIVE
LEUKOCYTE ESTERASE, URINE: NEGATIVE
MICROALBUMIN/CREAT 24H UR: 3.5 MG/G{CREAT}
MICROALBUMIN/CREAT UR-RTO: 45
NITRITE, URINE: NEGATIVE
PH UA: 6 (ref 4.5–8)
POTASSIUM SERPL-SCNC: 4.2 MMOL/L
PROTEIN UA: NEGATIVE
SODIUM BLD-SCNC: 142 MMOL/L
SPECIFIC GRAVITY, URINE: 1.02
UROBILINOGEN, URINE: NORMAL

## 2024-03-25 ENCOUNTER — OFFICE VISIT (OUTPATIENT)
Dept: CARDIOLOGY CLINIC | Age: 84
End: 2024-03-25
Payer: MEDICARE

## 2024-03-25 VITALS
BODY MASS INDEX: 23.59 KG/M2 | DIASTOLIC BLOOD PRESSURE: 78 MMHG | WEIGHT: 164.8 LBS | HEART RATE: 51 BPM | OXYGEN SATURATION: 97 % | HEIGHT: 70 IN | SYSTOLIC BLOOD PRESSURE: 128 MMHG

## 2024-03-25 DIAGNOSIS — Z95.2 S/P TAVR (TRANSCATHETER AORTIC VALVE REPLACEMENT): ICD-10-CM

## 2024-03-25 DIAGNOSIS — I48.0 PAROXYSMAL ATRIAL FIBRILLATION (HCC): ICD-10-CM

## 2024-03-25 DIAGNOSIS — I25.10 CORONARY ARTERY DISEASE INVOLVING NATIVE HEART WITHOUT ANGINA PECTORIS, UNSPECIFIED VESSEL OR LESION TYPE: ICD-10-CM

## 2024-03-25 DIAGNOSIS — I50.32 CHF (CONGESTIVE HEART FAILURE), NYHA CLASS I, CHRONIC, DIASTOLIC (HCC): Primary | ICD-10-CM

## 2024-03-25 PROCEDURE — 1036F TOBACCO NON-USER: CPT | Performed by: NURSE PRACTITIONER

## 2024-03-25 PROCEDURE — G8420 CALC BMI NORM PARAMETERS: HCPCS | Performed by: NURSE PRACTITIONER

## 2024-03-25 PROCEDURE — G8427 DOCREV CUR MEDS BY ELIG CLIN: HCPCS | Performed by: NURSE PRACTITIONER

## 2024-03-25 PROCEDURE — G8484 FLU IMMUNIZE NO ADMIN: HCPCS | Performed by: NURSE PRACTITIONER

## 2024-03-25 PROCEDURE — 1124F ACP DISCUSS-NO DSCNMKR DOCD: CPT | Performed by: NURSE PRACTITIONER

## 2024-03-25 PROCEDURE — 99214 OFFICE O/P EST MOD 30 MIN: CPT | Performed by: NURSE PRACTITIONER

## 2024-03-25 ASSESSMENT — ENCOUNTER SYMPTOMS
ABDOMINAL DISTENTION: 0
SHORTNESS OF BREATH: 0
COUGH: 0

## 2024-03-25 NOTE — PROGRESS NOTES
50-55%.   S/p TAVR. DOPPLER: Transaortic velocity was within the normal range with   no evidence of aortic stenosis (mean gradient 12 mmHg, max gradient 21   mmHg, Vmax 2.3 m/s, EOA 1.3 cm2). There was no evidence of aortic   regurgitation.   There was mild mitral regurgitation.   There was trace tricuspid regurgitation. Assuming RAP = 5 mmHg, the   estimated RVSP = 56 mmHg.   IVC size is within normal limits with normal respiratory phasic changes.      Signature      ----------------------------------------------------------------   Electronically signed by Alexis Tomlinson MD (Interpreting   physician) on 09/02/2023 at 12:00 PM    ECHO:   Summary   Normal left ventricular size and severely reduced systolic function.   There was severe global hypokinesis.   Wall thickness was within normal limits.   Ejection fraction was estimated at 30-35%.   The aortic valve was trileaflet with increased thickness, reduced   mobility, and severe calcification. Planimetry NATALY 0.4 cm2. There was no   echocardiographic evidence of a vegetation.   DSE done for LF-LG AS: Baseline - mean gradient 20 mmHg, Vmax 2.95 m/s;   with 20 mcg/kg/min of dobutamine the Vmax went to 5.1 m/s, mean gradient   60 mmHg, and max gradient 105 mmHg, suggestive of severe LF-LG aortic   stenosis with contractile reserve; EF 40-45%; there was worsening septal   hypokinesis with high dose dobutamine. Moderate to severe aortic   regurgitation.   There was moderate mitral regurgitation.   Mild tricuspid regurgitation.      Signature      ----------------------------------------------------------------   Electronically signed by Alexis Tomlinson MD (Interpreting   physician) on 07/20/2023 at 09:15 PM   ----------------------------------------------------------------       CATH  SUMMARY:  Successful PCI of the LAD with two overlapping drug-eluting  stents.     PLAN:  1.  Bedrest.  2.  Optimal medical therapy.  3.  Risk factor management.  4.  Routine access site

## 2024-03-25 NOTE — PATIENT INSTRUCTIONS
You may receive a survey regarding the care you received during your visit.  Your input is valuable to us.  We encourage you to complete and return your survey.  We hope you will choose us in the future for your healthcare needs.    Your nurses today were Pura.  Office hours:   Mon-Thurs 8-4:30  Friday 8-12  Phone: [phone]    Continue:  Continue current medications  Daily weights and record  Fluid restriction of 2 Liters per day  Limit sodium in diet to around 6188-6520 mg/day  Monitor BP  Activity as tolerated     Call the Heart Failure Clinic for any of the following symptoms:   Weight gain of 2-3 pounds in 1 day or 5 pounds in 1 week  Increased shortness of breath  Shortness of breath while laying down  Cough  Chest pain  Swelling in feet, ankles or legs  Bloating in abdomen  Fatigue          Ok for Prilosec (generic - Omeprazole) for periods of indigestion.

## 2024-04-30 ENCOUNTER — TELEPHONE (OUTPATIENT)
Dept: NEPHROLOGY | Age: 84
End: 2024-04-30

## 2024-04-30 DIAGNOSIS — N18.31 STAGE 3A CHRONIC KIDNEY DISEASE (HCC): Primary | ICD-10-CM

## 2024-04-30 NOTE — TELEPHONE ENCOUNTER
Yasmany called stating he was at his doctors office this morning and there weren't any labs there for Dr. Dallas. I asked if he requested we fax them there and he said no. He thought his labs from 2 months ago would be good. (He had to cancel his 3/7/24 appointment) I explained Dr. Dallas will need new labs. He asked that we fax them to his PCP at [phone]. Placed new orders and faxed.

## 2024-05-02 ENCOUNTER — OFFICE VISIT (OUTPATIENT)
Dept: NEPHROLOGY | Age: 84
End: 2024-05-02
Payer: MEDICARE

## 2024-05-02 VITALS
OXYGEN SATURATION: 99 % | DIASTOLIC BLOOD PRESSURE: 58 MMHG | HEART RATE: 56 BPM | BODY MASS INDEX: 23.43 KG/M2 | SYSTOLIC BLOOD PRESSURE: 140 MMHG | WEIGHT: 161 LBS

## 2024-05-02 DIAGNOSIS — R80.0 ISOLATED NON-NEPHROTIC PROTEINURIA: ICD-10-CM

## 2024-05-02 DIAGNOSIS — N18.31 STAGE 3A CHRONIC KIDNEY DISEASE (HCC): Primary | ICD-10-CM

## 2024-05-02 DIAGNOSIS — I10 ESSENTIAL HYPERTENSION: ICD-10-CM

## 2024-05-02 PROCEDURE — 1036F TOBACCO NON-USER: CPT | Performed by: INTERNAL MEDICINE

## 2024-05-02 PROCEDURE — G8427 DOCREV CUR MEDS BY ELIG CLIN: HCPCS | Performed by: INTERNAL MEDICINE

## 2024-05-02 PROCEDURE — 99213 OFFICE O/P EST LOW 20 MIN: CPT | Performed by: INTERNAL MEDICINE

## 2024-05-02 PROCEDURE — 3077F SYST BP >= 140 MM HG: CPT | Performed by: INTERNAL MEDICINE

## 2024-05-02 PROCEDURE — G8420 CALC BMI NORM PARAMETERS: HCPCS | Performed by: INTERNAL MEDICINE

## 2024-05-02 PROCEDURE — 1124F ACP DISCUSS-NO DSCNMKR DOCD: CPT | Performed by: INTERNAL MEDICINE

## 2024-05-02 PROCEDURE — 3078F DIAST BP <80 MM HG: CPT | Performed by: INTERNAL MEDICINE

## 2024-05-02 NOTE — PROGRESS NOTES
[facility]        Outpatient Follow-Up note         5/2/2024 10:47 AM    Patient Name:   Yasmany Velazco  YOB: 1940  Primary Care Physician:  Dwight Nunes MD   [facility]  [address] [place] [address]  Dept: [phone]  Loc: [phone]     Chief Complaint / Reason for follow-up : Follow Up of CKD     Interval History :  Patient seen and examined by me.   Feels well. No complaints  No hospitalizations and no med changes      Past History :  Past Medical History:   Diagnosis Date    CAD (coronary artery disease)     Chronic kidney disease     Hypertension      Past Surgical History:   Procedure Laterality Date    ABDOMEN SURGERY      2 hernia OR one 1965, 1993    JOINT REPLACEMENT Right 2010    hip replacement        Medications :     Outpatient Medications Marked as Taking for the 5/2/24 encounter (Office Visit) with Perfecto Dallas MD   Medication Sig Dispense Refill    clopidogrel (PLAVIX) 75 MG tablet Take 1 tablet by mouth once daily 90 tablet 2    atorvastatin (LIPITOR) 80 MG tablet Take 1 tablet by mouth nightly 90 tablet 3    metoprolol succinate (TOPROL XL) 25 MG extended release tablet Take 1 tablet by mouth daily 30 tablet 3    apixaban (ELIQUIS) 5 MG TABS tablet Take 1 tablet by mouth 2 times daily 60 tablet 1    empagliflozin (JARDIANCE) 10 MG tablet Take 1 tablet by mouth daily 30 tablet 3    losartan (COZAAR) 100 MG tablet Take 1 tablet by mouth daily Indications: resume on 9/2/23         Vitals     BP (!) 140/58 (Site: Left Upper Arm, Position: Sitting, Cuff Size: Medium Adult)   Pulse 56   Wt 73 kg (161 lb)   SpO2 99%   BMI 23.43 kg/m²  Wt Readings from Last 3 Encounters:   05/02/24 73 kg (161 lb)   03/25/24 74.8 kg (164 lb 12.8 oz)   10/16/23 73.5 kg (162 lb)        Physical Exam     General -- no distress  Lungs -- clear  Heart -- S1, S2 heard, JVD

## 2024-06-10 ENCOUNTER — TELEPHONE (OUTPATIENT)
Dept: CARDIOLOGY CLINIC | Age: 84
End: 2024-06-10

## 2024-08-30 ENCOUNTER — HOSPITAL ENCOUNTER (OUTPATIENT)
Age: 84
End: 2024-08-30
Attending: INTERNAL MEDICINE
Payer: MEDICARE

## 2024-08-30 VITALS
HEIGHT: 69 IN | SYSTOLIC BLOOD PRESSURE: 120 MMHG | WEIGHT: 162 LBS | BODY MASS INDEX: 23.99 KG/M2 | DIASTOLIC BLOOD PRESSURE: 51 MMHG

## 2024-08-30 DIAGNOSIS — Z95.2 S/P TAVR (TRANSCATHETER AORTIC VALVE REPLACEMENT): ICD-10-CM

## 2024-08-30 LAB
ECHO AV MEAN GRADIENT: 12 MMHG
ECHO AV MEAN VELOCITY: 1.7 M/S
ECHO AV PEAK GRADIENT: 21 MMHG
ECHO AV PEAK VELOCITY: 2.3 M/S
ECHO AV VELOCITY RATIO: 0.35
ECHO AV VTI: 51.4 CM
ECHO BSA: 1.89 M2
ECHO LA AREA 2C: 19.8 CM2
ECHO LA AREA 4C: 20.7 CM2
ECHO LA DIAMETER INDEX: 2.12 CM/M2
ECHO LA DIAMETER: 4 CM
ECHO LA MAJOR AXIS: 6 CM
ECHO LA MINOR AXIS: 5.2 CM
ECHO LA VOL BP: 63 ML (ref 18–58)
ECHO LA VOL MOD A2C: 62 ML (ref 18–58)
ECHO LA VOL MOD A4C: 56 ML (ref 18–58)
ECHO LA VOL/BSA BIPLANE: 33 ML/M2 (ref 16–34)
ECHO LA VOLUME INDEX MOD A2C: 33 ML/M2 (ref 16–34)
ECHO LA VOLUME INDEX MOD A4C: 30 ML/M2 (ref 16–34)
ECHO LV E' LATERAL VELOCITY: 10 CM/S
ECHO LV E' SEPTAL VELOCITY: 5 CM/S
ECHO LV EDV A2C: 115 ML
ECHO LV EDV A4C: 111 ML
ECHO LV EDV INDEX A4C: 59 ML/M2
ECHO LV EDV NDEX A2C: 61 ML/M2
ECHO LV EJECTION FRACTION A2C: 44 %
ECHO LV EJECTION FRACTION A4C: 33 %
ECHO LV EJECTION FRACTION BIPLANE: 39 % (ref 55–100)
ECHO LV ESV A2C: 65 ML
ECHO LV ESV A4C: 75 ML
ECHO LV ESV INDEX A2C: 34 ML/M2
ECHO LV ESV INDEX A4C: 40 ML/M2
ECHO LV FRACTIONAL SHORTENING: 19 % (ref 28–44)
ECHO LV INTERNAL DIMENSION DIASTOLE INDEX: 3.02 CM/M2
ECHO LV INTERNAL DIMENSION DIASTOLIC: 5.7 CM (ref 4.2–5.9)
ECHO LV INTERNAL DIMENSION SYSTOLIC INDEX: 2.43 CM/M2
ECHO LV INTERNAL DIMENSION SYSTOLIC: 4.6 CM
ECHO LV ISOVOLUMETRIC RELAXATION TIME (IVRT): 85 MS
ECHO LV IVSD: 1.4 CM (ref 0.6–1)
ECHO LV MASS 2D: 272.5 G (ref 88–224)
ECHO LV MASS INDEX 2D: 144.2 G/M2 (ref 49–115)
ECHO LV POSTERIOR WALL DIASTOLIC: 0.9 CM (ref 0.6–1)
ECHO LV RELATIVE WALL THICKNESS RATIO: 0.32
ECHO LVOT AV VTI INDEX: 0.33
ECHO LVOT MEAN GRADIENT: 1 MMHG
ECHO LVOT PEAK GRADIENT: 2 MMHG
ECHO LVOT PEAK VELOCITY: 0.8 M/S
ECHO LVOT VTI: 17.1 CM
ECHO MV A VELOCITY: 0.27 M/S
ECHO MV E DECELERATION TIME (DT): 239 MS
ECHO MV E VELOCITY: 0.88 M/S
ECHO MV E/A RATIO: 3.26
ECHO MV E/E' LATERAL: 8.8
ECHO MV E/E' RATIO (AVERAGED): 13.2
ECHO MV E/E' SEPTAL: 17.6
ECHO MV REGURGITANT PEAK GRADIENT: 112 MMHG
ECHO MV REGURGITANT PEAK VELOCITY: 5.3 M/S
ECHO PULMONARY ARTERY END DIASTOLIC PRESSURE: 9 MMHG
ECHO PV MAX VELOCITY: 0.6 M/S
ECHO PV PEAK GRADIENT: 1 MMHG
ECHO PV REGURGITANT MAX VELOCITY: 1.5 M/S
ECHO RV INTERNAL DIMENSION: 2.5 CM
ECHO RV TAPSE: 1.7 CM (ref 1.7–?)
ECHO TV E WAVE: 0.4 M/S
ECHO TV REGURGITANT MAX VELOCITY: 2.73 M/S
ECHO TV REGURGITANT PEAK GRADIENT: 30 MMHG

## 2024-08-30 PROCEDURE — 93306 TTE W/DOPPLER COMPLETE: CPT

## 2024-08-30 PROCEDURE — 93306 TTE W/DOPPLER COMPLETE: CPT | Performed by: INTERNAL MEDICINE

## 2024-09-03 ENCOUNTER — OFFICE VISIT (OUTPATIENT)
Dept: CARDIOLOGY CLINIC | Age: 84
End: 2024-09-03
Payer: MEDICARE

## 2024-09-03 ENCOUNTER — HOSPITAL ENCOUNTER (OUTPATIENT)
Age: 84
Discharge: HOME | End: 2024-09-03
Payer: MEDICARE

## 2024-09-03 VITALS
DIASTOLIC BLOOD PRESSURE: 78 MMHG | WEIGHT: 164.5 LBS | HEART RATE: 53 BPM | HEIGHT: 70 IN | BODY MASS INDEX: 23.55 KG/M2 | SYSTOLIC BLOOD PRESSURE: 136 MMHG

## 2024-09-03 DIAGNOSIS — N18.30 STAGE 3 CHRONIC KIDNEY DISEASE, UNSPECIFIED WHETHER STAGE 3A OR 3B CKD (HCC): ICD-10-CM

## 2024-09-03 DIAGNOSIS — I48.0 PAROXYSMAL ATRIAL FIBRILLATION (HCC): ICD-10-CM

## 2024-09-03 DIAGNOSIS — I50.32 CHF (CONGESTIVE HEART FAILURE), NYHA CLASS I, CHRONIC, DIASTOLIC (HCC): ICD-10-CM

## 2024-09-03 DIAGNOSIS — I25.10 CORONARY ARTERY DISEASE INVOLVING NATIVE HEART WITHOUT ANGINA PECTORIS, UNSPECIFIED VESSEL OR LESION TYPE: ICD-10-CM

## 2024-09-03 DIAGNOSIS — Z95.2 S/P TAVR (TRANSCATHETER AORTIC VALVE REPLACEMENT): Primary | ICD-10-CM

## 2024-09-03 LAB
ANION GAP SERPL CALC-SCNC: 12 MEQ/L (ref 8–16)
BUN SERPL-MCNC: 30 MG/DL (ref 7–22)
CALCIUM SERPL-MCNC: 8.8 MG/DL (ref 8.5–10.5)
CHLORIDE SERPL-SCNC: 106 MEQ/L (ref 98–111)
CO2 SERPL-SCNC: 24 MEQ/L (ref 23–33)
CREAT SERPL-MCNC: 1.1 MG/DL (ref 0.4–1.2)
DEPRECATED RDW RBC AUTO: 46.5 FL (ref 35–45)
ERYTHROCYTE [DISTWIDTH] IN BLOOD BY AUTOMATED COUNT: 13.7 % (ref 11.5–14.5)
GFR SERPL CREATININE-BSD FRML MDRD: 66 ML/MIN/1.73M2
GLUCOSE SERPL-MCNC: 133 MG/DL (ref 70–108)
HCT VFR BLD AUTO: 46.5 % (ref 42–52)
HGB BLD-MCNC: 14.9 GM/DL (ref 14–18)
MCH RBC QN AUTO: 29.7 PG (ref 26–33)
MCHC RBC AUTO-ENTMCNC: 32 GM/DL (ref 32.2–35.5)
MCV RBC AUTO: 92.8 FL (ref 80–94)
PLATELET # BLD AUTO: 187 THOU/MM3 (ref 130–400)
PMV BLD AUTO: 10.5 FL (ref 9.4–12.4)
POTASSIUM SERPL-SCNC: 4.7 MEQ/L (ref 3.5–5.2)
RBC # BLD AUTO: 5.01 MILL/MM3 (ref 4.7–6.1)
SODIUM SERPL-SCNC: 142 MEQ/L (ref 135–145)
WBC # BLD AUTO: 8.5 THOU/MM3 (ref 4.8–10.8)

## 2024-09-03 PROCEDURE — 36415 COLL VENOUS BLD VENIPUNCTURE: CPT

## 2024-09-03 PROCEDURE — 1124F ACP DISCUSS-NO DSCNMKR DOCD: CPT | Performed by: NURSE PRACTITIONER

## 2024-09-03 PROCEDURE — 1036F TOBACCO NON-USER: CPT | Performed by: NURSE PRACTITIONER

## 2024-09-03 PROCEDURE — G8427 DOCREV CUR MEDS BY ELIG CLIN: HCPCS | Performed by: NURSE PRACTITIONER

## 2024-09-03 PROCEDURE — 99214 OFFICE O/P EST MOD 30 MIN: CPT | Performed by: NURSE PRACTITIONER

## 2024-09-03 PROCEDURE — 93000 ELECTROCARDIOGRAM COMPLETE: CPT | Performed by: NURSE PRACTITIONER

## 2024-09-03 PROCEDURE — 80048 BASIC METABOLIC PNL TOTAL CA: CPT

## 2024-09-03 PROCEDURE — G8420 CALC BMI NORM PARAMETERS: HCPCS | Performed by: NURSE PRACTITIONER

## 2024-09-03 PROCEDURE — 85027 COMPLETE CBC AUTOMATED: CPT

## 2024-09-16 ENCOUNTER — TELEPHONE (OUTPATIENT)
Dept: CARDIOLOGY CLINIC | Age: 84
End: 2024-09-16

## 2024-09-17 RX ORDER — METOPROLOL SUCCINATE 25 MG/1
25 TABLET, EXTENDED RELEASE ORAL DAILY
Qty: 90 TABLET | Refills: 3 | OUTPATIENT
Start: 2024-09-17

## 2024-09-17 RX ORDER — METOPROLOL SUCCINATE 25 MG/1
25 TABLET, EXTENDED RELEASE ORAL DAILY
Qty: 90 TABLET | Refills: 3 | Status: SHIPPED | OUTPATIENT
Start: 2024-09-17

## 2024-09-17 RX ORDER — CLOPIDOGREL BISULFATE 75 MG/1
75 TABLET ORAL DAILY
Qty: 90 TABLET | Refills: 0 | Status: SHIPPED | OUTPATIENT
Start: 2024-09-17

## 2024-09-17 RX ORDER — CLOPIDOGREL BISULFATE 75 MG/1
75 TABLET ORAL DAILY
Qty: 90 TABLET | Refills: 3 | OUTPATIENT
Start: 2024-09-17

## 2024-11-13 ENCOUNTER — TELEPHONE (OUTPATIENT)
Dept: CARDIOLOGY CLINIC | Age: 84
End: 2024-11-13

## 2024-11-13 NOTE — TELEPHONE ENCOUNTER
Received message that pt had questions regarding medication assistance. Called pt and LM on VM asking pt to return call.

## 2024-11-14 NOTE — TELEPHONE ENCOUNTER
Spoke with pt who has Jardiance application at his home. Pt needs Eliquis. Informed pt an application will be mailed to his home address.     Pt will complete the applications and bring them to the office for our physician will be completed.

## 2024-11-26 RX ORDER — ATORVASTATIN CALCIUM 80 MG/1
80 TABLET, FILM COATED ORAL NIGHTLY
Qty: 90 TABLET | Refills: 3 | Status: SHIPPED | OUTPATIENT
Start: 2024-11-26

## 2024-12-16 NOTE — TELEPHONE ENCOUNTER
Patient called in stating he has still not received his Jardiance mail order and is completely out of his medication.  He skipped his dose yesterday.  He did go to the post office this morning but were unable to locate any package for him due to quantity in .  He is asking for another week of samples if this is possible until his mail order arrives.

## 2025-03-21 RX ORDER — CLOPIDOGREL BISULFATE 75 MG/1
75 TABLET ORAL DAILY
Qty: 90 TABLET | Refills: 3 | Status: SHIPPED | OUTPATIENT
Start: 2025-03-21

## 2025-03-25 ENCOUNTER — OFFICE VISIT (OUTPATIENT)
Dept: CARDIOLOGY CLINIC | Age: 85
End: 2025-03-25
Payer: MEDICARE

## 2025-03-25 VITALS
SYSTOLIC BLOOD PRESSURE: 142 MMHG | WEIGHT: 167 LBS | HEART RATE: 46 BPM | OXYGEN SATURATION: 96 % | HEIGHT: 70 IN | DIASTOLIC BLOOD PRESSURE: 72 MMHG | BODY MASS INDEX: 23.91 KG/M2

## 2025-03-25 DIAGNOSIS — I50.22 CHF NYHA CLASS II, CHRONIC, SYSTOLIC (HCC): Primary | ICD-10-CM

## 2025-03-25 DIAGNOSIS — I48.0 PAROXYSMAL ATRIAL FIBRILLATION (HCC): ICD-10-CM

## 2025-03-25 DIAGNOSIS — R00.1 BRADYCARDIA: ICD-10-CM

## 2025-03-25 DIAGNOSIS — Z95.2 S/P TAVR (TRANSCATHETER AORTIC VALVE REPLACEMENT): ICD-10-CM

## 2025-03-25 PROCEDURE — 1036F TOBACCO NON-USER: CPT | Performed by: NURSE PRACTITIONER

## 2025-03-25 PROCEDURE — 1159F MED LIST DOCD IN RCRD: CPT | Performed by: NURSE PRACTITIONER

## 2025-03-25 PROCEDURE — 1124F ACP DISCUSS-NO DSCNMKR DOCD: CPT | Performed by: NURSE PRACTITIONER

## 2025-03-25 PROCEDURE — 1160F RVW MEDS BY RX/DR IN RCRD: CPT | Performed by: NURSE PRACTITIONER

## 2025-03-25 PROCEDURE — 93000 ELECTROCARDIOGRAM COMPLETE: CPT | Performed by: NURSE PRACTITIONER

## 2025-03-25 PROCEDURE — G8427 DOCREV CUR MEDS BY ELIG CLIN: HCPCS | Performed by: NURSE PRACTITIONER

## 2025-03-25 PROCEDURE — 99214 OFFICE O/P EST MOD 30 MIN: CPT | Performed by: NURSE PRACTITIONER

## 2025-03-25 PROCEDURE — G8420 CALC BMI NORM PARAMETERS: HCPCS | Performed by: NURSE PRACTITIONER

## 2025-03-25 ASSESSMENT — ENCOUNTER SYMPTOMS
COUGH: 0
SHORTNESS OF BREATH: 0
ABDOMINAL DISTENTION: 0

## 2025-03-25 NOTE — PROGRESS NOTES
Heart Failure Clinic       Visit Date: 3/25/2025  Cardiologist:  Dr. Tomlinson  Primary Care Physician: Dwight Izaguirre MD  Referred by:     Yasmany Velazco is a 84 y.o. male who presents today for:  Chief Complaint   Patient presents with    Congestive Heart Failure       HPI:   Yasmany Velazco is a 84 y.o. male who presents to the office for a follow up patient visit in the heart failure clinic.  Accompanied by no one    TYPE HF: HFrEF 25% >> 50-55% (post TAVR) >>45% >> 35-40%  Cause: ICM/Valvular  Valves:  Severe AS s/p TAVR (8/31/23), Mod MR  Device:  no  HX: Afib (Eliquis), CAD s/p PCI (LADx2)    Hospitalization:  7/2023 - new Afib,  new AS,     8/2023 -  169#  Been home about 10 days = still SOB and fatigued, but \"not as bad\"  Denies CP, dizziness.  No appetite still.  Down 13# in past 10 days - swelling \"75%\" improved.  Walked from entrance, pushing wife in wheelchair - overall very active.  Helps at [employer].     9/2023 - F/U TAVR  167#  Home readings reviewed - -140s - HR 56-66  Doing great - feeling 90% improved - energy and breathing better  No dizziness or palpitations.        10/2023 = f/u per Davi   162#  Feeling good - no fluid on exam.    Had some leg pain w/ walking - getting better.  BPs good - 120-140s.   No dizziness   Doing part time job at [employer]     3/2024 - 164#  Feet more tender, ? If d/t weight loss.  Thinks he had vascular testing at [facility] about 4mth ago.   No pain when walking.  Legs do get tired.   Indigestion more easily.    Energy even better since last visit.   Not feel limited - pretty active.       TODAY 3/2025 - 167#  Little renetta today - states feels like beats delayed at times.   EKG done - SB w/ PVC, PAC noted.  Overall feeling better past year   Does not feel limited.  Pretty active.  No issues w/ walking, stairs.  Can do what wants do.  States happy with how he's doing.   No fluid issues - none on exam.         Past Medical History:   Diagnosis

## 2025-03-25 NOTE — PATIENT INSTRUCTIONS
You may receive a survey regarding the care you received during your visit.  Your input is valuable to us.  We encourage you to complete and return your survey.  We hope you will choose us in the future for your healthcare needs.    Your nurses today were Jane Mcfadden and Felicia.  Office hours:   Mon-Thurs 8-4:30  Friday 8-12  Phone: [phone]    Continue:  Continue current medications  Daily weights and record  Fluid restriction of 2 Liters per day  Limit sodium in diet to around 6196-4988 mg/day  Monitor BP    Call the Heart Failure Clinic for any of the following symptoms:   Weight gain of 3 pounds in 1 day or 5 pounds in 1 week  Increased shortness of breath  Shortness of breath while laying down  Chest pain  Swelling in feet, ankles or legs  Bloating in abdomen  Fatigue

## 2025-04-24 LAB
BILIRUBIN, URINE: NEGATIVE
BLOOD, URINE: NEGATIVE
BUN BLDV-MCNC: 26 MG/DL
CALCIUM SERPL-MCNC: 8.8 MG/DL
CHLORIDE BLD-SCNC: 107 MMOL/L
CLARITY, UA: CLEAR
CO2: 25 MMOL/L
COLOR, UA: YELLOW
CREAT SERPL-MCNC: 1.2 MG/DL
CREATININE URINE: 126.3 MG/DL
EGFR: 55
GLUCOSE BLD-MCNC: 99 MG/DL
GLUCOSE URINE: >=1000
KETONES, URINE: NEGATIVE
LEUKOCYTE ESTERASE, URINE: NEGATIVE
MICROALBUMIN/CREAT 24H UR: 3.1 MG/DL
MICROALBUMIN/CREAT UR-RTO: 25 MG/G
NITRITE, URINE: NEGATIVE
PH UA: 5.5 (ref 4.5–8)
POTASSIUM SERPL-SCNC: 4.2 MMOL/L
PROTEIN UA: NEGATIVE
SODIUM BLD-SCNC: 142 MMOL/L
SPECIFIC GRAVITY UA: 1.02 (ref 1–1.03)
UROBILINOGEN, URINE: NORMAL

## 2025-05-01 ENCOUNTER — OFFICE VISIT (OUTPATIENT)
Dept: NEPHROLOGY | Age: 85
End: 2025-05-01
Payer: MEDICARE

## 2025-05-01 VITALS
HEART RATE: 45 BPM | HEIGHT: 69 IN | BODY MASS INDEX: 24.11 KG/M2 | OXYGEN SATURATION: 98 % | SYSTOLIC BLOOD PRESSURE: 130 MMHG | DIASTOLIC BLOOD PRESSURE: 62 MMHG | WEIGHT: 162.8 LBS

## 2025-05-01 DIAGNOSIS — N18.31 STAGE 3A CHRONIC KIDNEY DISEASE (HCC): Primary | ICD-10-CM

## 2025-05-01 DIAGNOSIS — I10 ESSENTIAL HYPERTENSION: ICD-10-CM

## 2025-05-01 DIAGNOSIS — R80.0 ISOLATED NON-NEPHROTIC PROTEINURIA: ICD-10-CM

## 2025-05-01 PROCEDURE — 3075F SYST BP GE 130 - 139MM HG: CPT | Performed by: INTERNAL MEDICINE

## 2025-05-01 PROCEDURE — G8427 DOCREV CUR MEDS BY ELIG CLIN: HCPCS | Performed by: INTERNAL MEDICINE

## 2025-05-01 PROCEDURE — 99213 OFFICE O/P EST LOW 20 MIN: CPT | Performed by: INTERNAL MEDICINE

## 2025-05-01 PROCEDURE — 1036F TOBACCO NON-USER: CPT | Performed by: INTERNAL MEDICINE

## 2025-05-01 PROCEDURE — G8420 CALC BMI NORM PARAMETERS: HCPCS | Performed by: INTERNAL MEDICINE

## 2025-05-01 PROCEDURE — G2211 COMPLEX E/M VISIT ADD ON: HCPCS | Performed by: INTERNAL MEDICINE

## 2025-05-01 PROCEDURE — 3078F DIAST BP <80 MM HG: CPT | Performed by: INTERNAL MEDICINE

## 2025-05-01 PROCEDURE — 1159F MED LIST DOCD IN RCRD: CPT | Performed by: INTERNAL MEDICINE

## 2025-05-01 PROCEDURE — 1124F ACP DISCUSS-NO DSCNMKR DOCD: CPT | Performed by: INTERNAL MEDICINE

## 2025-05-01 NOTE — PROGRESS NOTES
[facility]        Outpatient Follow-Up note         5/1/2025 10:23 AM    Patient Name:   Yasmany Velazco  YOB: 1940  Primary Care Physician:  Dwight Nunes MD   [facility]  [address] [place] [address]  Dept: [phone]  Loc: [phone]     Chief Complaint / Reason for follow-up : Follow Up of CKD     Interval History :  Patient seen and examined by me.   Feels well. No complaints  No hospitalizations and no med changes      Past History :  Past Medical History:   Diagnosis Date    CAD (coronary artery disease)     Chronic kidney disease     Hypertension      Past Surgical History:   Procedure Laterality Date    ABDOMEN SURGERY      2 hernia OR one 1965, 1993    JOINT REPLACEMENT Right 2010    hip replacement        Medications :     Outpatient Medications Marked as Taking for the 5/1/25 encounter (Office Visit) with Perfecto Dallas MD   Medication Sig Dispense Refill    clopidogrel (PLAVIX) 75 MG tablet Take 1 tablet by mouth daily 90 tablet 3    empagliflozin (JARDIANCE) 10 MG tablet Take 1 tablet by mouth daily 21 tablet 0    atorvastatin (LIPITOR) 80 MG tablet Take 1 tablet by mouth nightly 90 tablet 3    metoprolol succinate (TOPROL XL) 25 MG extended release tablet Take 1 tablet by mouth daily 90 tablet 3    apixaban (ELIQUIS) 5 MG TABS tablet Take 1 tablet by mouth 2 times daily 60 tablet 1    losartan (COZAAR) 100 MG tablet Take 1 tablet by mouth daily Indications: resume on 9/2/23         Vitals     /62 (BP Site: Left Upper Arm, Patient Position: Sitting, BP Cuff Size: Medium Adult)   Pulse (!) 45   Ht 1.753 m (5' 9\")   Wt 73.8 kg (162 lb 12.8 oz)   SpO2 98%   BMI 24.04 kg/m²  Wt Readings from Last 3 Encounters:   05/01/25 73.8 kg (162 lb 12.8 oz)   03/25/25 75.8 kg (167 lb)   09/03/24 74.6 kg (164 lb 8 oz)        Physical Exam     General -- no

## 2025-09-02 ENCOUNTER — OFFICE VISIT (OUTPATIENT)
Dept: CARDIOLOGY CLINIC | Age: 85
End: 2025-09-02
Payer: MEDICARE

## 2025-09-02 VITALS
HEART RATE: 54 BPM | HEIGHT: 69 IN | WEIGHT: 166.6 LBS | BODY MASS INDEX: 24.68 KG/M2 | SYSTOLIC BLOOD PRESSURE: 134 MMHG | DIASTOLIC BLOOD PRESSURE: 76 MMHG

## 2025-09-02 DIAGNOSIS — Z95.2 S/P TAVR (TRANSCATHETER AORTIC VALVE REPLACEMENT): ICD-10-CM

## 2025-09-02 DIAGNOSIS — I48.0 PAROXYSMAL ATRIAL FIBRILLATION (HCC): ICD-10-CM

## 2025-09-02 DIAGNOSIS — I50.32 HEART FAILURE WITH IMPROVED EJECTION FRACTION (HFIMPEF) (HCC): Primary | ICD-10-CM

## 2025-09-02 PROCEDURE — 1036F TOBACCO NON-USER: CPT | Performed by: PHYSICIAN ASSISTANT

## 2025-09-02 PROCEDURE — G8427 DOCREV CUR MEDS BY ELIG CLIN: HCPCS | Performed by: PHYSICIAN ASSISTANT

## 2025-09-02 PROCEDURE — 1124F ACP DISCUSS-NO DSCNMKR DOCD: CPT | Performed by: PHYSICIAN ASSISTANT

## 2025-09-02 PROCEDURE — 1159F MED LIST DOCD IN RCRD: CPT | Performed by: PHYSICIAN ASSISTANT

## 2025-09-02 PROCEDURE — G8420 CALC BMI NORM PARAMETERS: HCPCS | Performed by: PHYSICIAN ASSISTANT

## 2025-09-02 PROCEDURE — 99214 OFFICE O/P EST MOD 30 MIN: CPT | Performed by: PHYSICIAN ASSISTANT
